# Patient Record
Sex: MALE | Race: WHITE | NOT HISPANIC OR LATINO | URBAN - METROPOLITAN AREA
[De-identification: names, ages, dates, MRNs, and addresses within clinical notes are randomized per-mention and may not be internally consistent; named-entity substitution may affect disease eponyms.]

---

## 2021-05-13 ENCOUNTER — INPATIENT (INPATIENT)
Facility: HOSPITAL | Age: 26
LOS: 1 days | Discharge: HOME | DRG: 349 | End: 2021-05-15
Attending: SURGERY | Admitting: SURGERY
Payer: MEDICAID

## 2021-05-13 VITALS
HEIGHT: 69 IN | OXYGEN SATURATION: 96 % | WEIGHT: 139.99 LBS | TEMPERATURE: 101 F | HEART RATE: 120 BPM | RESPIRATION RATE: 18 BRPM | DIASTOLIC BLOOD PRESSURE: 92 MMHG | SYSTOLIC BLOOD PRESSURE: 147 MMHG

## 2021-05-13 DIAGNOSIS — Z21 ASYMPTOMATIC HUMAN IMMUNODEFICIENCY VIRUS [HIV] INFECTION STATUS: ICD-10-CM

## 2021-05-13 DIAGNOSIS — K61.0 ANAL ABSCESS: ICD-10-CM

## 2021-05-13 DIAGNOSIS — Z86.19 PERSONAL HISTORY OF OTHER INFECTIOUS AND PARASITIC DISEASES: ICD-10-CM

## 2021-05-13 DIAGNOSIS — Z90.89 ACQUIRED ABSENCE OF OTHER ORGANS: Chronic | ICD-10-CM

## 2021-05-13 DIAGNOSIS — F17.210 NICOTINE DEPENDENCE, CIGARETTES, UNCOMPLICATED: ICD-10-CM

## 2021-05-13 DIAGNOSIS — B96.89 OTHER SPECIFIED BACTERIAL AGENTS AS THE CAUSE OF DISEASES CLASSIFIED ELSEWHERE: ICD-10-CM

## 2021-05-13 DIAGNOSIS — K59.00 CONSTIPATION, UNSPECIFIED: ICD-10-CM

## 2021-05-13 DIAGNOSIS — Z72.51 HIGH RISK HETEROSEXUAL BEHAVIOR: ICD-10-CM

## 2021-05-13 LAB
ALBUMIN SERPL ELPH-MCNC: 3 G/DL — LOW (ref 3.4–5)
ALP SERPL-CCNC: 88 U/L — SIGNIFICANT CHANGE UP (ref 40–120)
ALT FLD-CCNC: 17 U/L — SIGNIFICANT CHANGE UP (ref 12–42)
ANION GAP SERPL CALC-SCNC: 7 MMOL/L — LOW (ref 9–16)
APPEARANCE UR: CLEAR — SIGNIFICANT CHANGE UP
APTT BLD: 33.9 SEC — SIGNIFICANT CHANGE UP (ref 27.5–35.5)
AST SERPL-CCNC: 16 U/L — SIGNIFICANT CHANGE UP (ref 15–37)
BASOPHILS # BLD AUTO: 0.01 K/UL — SIGNIFICANT CHANGE UP (ref 0–0.2)
BASOPHILS NFR BLD AUTO: 0.1 % — SIGNIFICANT CHANGE UP (ref 0–2)
BILIRUB SERPL-MCNC: 0.4 MG/DL — SIGNIFICANT CHANGE UP (ref 0.2–1.2)
BILIRUB UR-MCNC: NEGATIVE — SIGNIFICANT CHANGE UP
BUN SERPL-MCNC: 9 MG/DL — SIGNIFICANT CHANGE UP (ref 7–23)
CALCIUM SERPL-MCNC: 8.5 MG/DL — SIGNIFICANT CHANGE UP (ref 8.5–10.5)
CHLORIDE SERPL-SCNC: 97 MMOL/L — SIGNIFICANT CHANGE UP (ref 96–108)
CO2 SERPL-SCNC: 31 MMOL/L — SIGNIFICANT CHANGE UP (ref 22–31)
COLOR SPEC: YELLOW — SIGNIFICANT CHANGE UP
CREAT SERPL-MCNC: 0.85 MG/DL — SIGNIFICANT CHANGE UP (ref 0.5–1.3)
DIFF PNL FLD: NEGATIVE — SIGNIFICANT CHANGE UP
EOSINOPHIL # BLD AUTO: 0.09 K/UL — SIGNIFICANT CHANGE UP (ref 0–0.5)
EOSINOPHIL NFR BLD AUTO: 1 % — SIGNIFICANT CHANGE UP (ref 0–6)
GLUCOSE SERPL-MCNC: 92 MG/DL — SIGNIFICANT CHANGE UP (ref 70–99)
GLUCOSE UR QL: NEGATIVE — SIGNIFICANT CHANGE UP
HCT VFR BLD CALC: 31.5 % — LOW (ref 39–50)
HGB BLD-MCNC: 9.7 G/DL — LOW (ref 13–17)
IMM GRANULOCYTES NFR BLD AUTO: 0.6 % — SIGNIFICANT CHANGE UP (ref 0–1.5)
INR BLD: 1.49 — HIGH (ref 0.88–1.16)
KETONES UR-MCNC: ABNORMAL MG/DL
LACTATE SERPL-SCNC: 0.7 MMOL/L — SIGNIFICANT CHANGE UP (ref 0.4–2)
LEUKOCYTE ESTERASE UR-ACNC: NEGATIVE — SIGNIFICANT CHANGE UP
LYMPHOCYTES # BLD AUTO: 1.43 K/UL — SIGNIFICANT CHANGE UP (ref 1–3.3)
LYMPHOCYTES # BLD AUTO: 16.1 % — SIGNIFICANT CHANGE UP (ref 13–44)
MCHC RBC-ENTMCNC: 23.5 PG — LOW (ref 27–34)
MCHC RBC-ENTMCNC: 30.8 GM/DL — LOW (ref 32–36)
MCV RBC AUTO: 76.5 FL — LOW (ref 80–100)
MONOCYTES # BLD AUTO: 0.95 K/UL — HIGH (ref 0–0.9)
MONOCYTES NFR BLD AUTO: 10.7 % — SIGNIFICANT CHANGE UP (ref 2–14)
NEUTROPHILS # BLD AUTO: 6.35 K/UL — SIGNIFICANT CHANGE UP (ref 1.8–7.4)
NEUTROPHILS NFR BLD AUTO: 71.5 % — SIGNIFICANT CHANGE UP (ref 43–77)
NITRITE UR-MCNC: NEGATIVE — SIGNIFICANT CHANGE UP
NRBC # BLD: 0 /100 WBCS — SIGNIFICANT CHANGE UP (ref 0–0)
PH UR: 6 — SIGNIFICANT CHANGE UP (ref 5–8)
PLATELET # BLD AUTO: 389 K/UL — SIGNIFICANT CHANGE UP (ref 150–400)
POTASSIUM SERPL-MCNC: 3.4 MMOL/L — LOW (ref 3.5–5.3)
POTASSIUM SERPL-SCNC: 3.4 MMOL/L — LOW (ref 3.5–5.3)
PROT SERPL-MCNC: 9.3 G/DL — HIGH (ref 6.4–8.2)
PROT UR-MCNC: NEGATIVE MG/DL — SIGNIFICANT CHANGE UP
PROTHROM AB SERPL-ACNC: 17.5 SEC — HIGH (ref 10.6–13.6)
RBC # BLD: 4.12 M/UL — LOW (ref 4.2–5.8)
RBC # FLD: 14.2 % — SIGNIFICANT CHANGE UP (ref 10.3–14.5)
SARS-COV-2 RNA SPEC QL NAA+PROBE: SIGNIFICANT CHANGE UP
SODIUM SERPL-SCNC: 135 MMOL/L — SIGNIFICANT CHANGE UP (ref 132–145)
SP GR SPEC: 1.01 — SIGNIFICANT CHANGE UP (ref 1–1.03)
UROBILINOGEN FLD QL: 0.2 E.U./DL — SIGNIFICANT CHANGE UP
WBC # BLD: 8.88 K/UL — SIGNIFICANT CHANGE UP (ref 3.8–10.5)
WBC # FLD AUTO: 8.88 K/UL — SIGNIFICANT CHANGE UP (ref 3.8–10.5)

## 2021-05-13 PROCEDURE — 71045 X-RAY EXAM CHEST 1 VIEW: CPT | Mod: 26

## 2021-05-13 PROCEDURE — 93010 ELECTROCARDIOGRAM REPORT: CPT

## 2021-05-13 PROCEDURE — 99285 EMERGENCY DEPT VISIT HI MDM: CPT

## 2021-05-13 PROCEDURE — 74177 CT ABD & PELVIS W/CONTRAST: CPT | Mod: 26

## 2021-05-13 RX ORDER — METRONIDAZOLE 500 MG
500 TABLET ORAL ONCE
Refills: 0 | Status: COMPLETED | OUTPATIENT
Start: 2021-05-13 | End: 2021-05-13

## 2021-05-13 RX ORDER — IOHEXOL 300 MG/ML
30 INJECTION, SOLUTION INTRAVENOUS ONCE
Refills: 0 | Status: COMPLETED | OUTPATIENT
Start: 2021-05-13 | End: 2021-05-13

## 2021-05-13 RX ORDER — METRONIDAZOLE 500 MG
500 TABLET ORAL EVERY 8 HOURS
Refills: 0 | Status: DISCONTINUED | OUTPATIENT
Start: 2021-05-14 | End: 2021-05-15

## 2021-05-13 RX ORDER — SODIUM CHLORIDE 9 MG/ML
1950 INJECTION INTRAMUSCULAR; INTRAVENOUS; SUBCUTANEOUS ONCE
Refills: 0 | Status: COMPLETED | OUTPATIENT
Start: 2021-05-13 | End: 2021-05-13

## 2021-05-13 RX ORDER — HYDROMORPHONE HYDROCHLORIDE 2 MG/ML
0.25 INJECTION INTRAMUSCULAR; INTRAVENOUS; SUBCUTANEOUS ONCE
Refills: 0 | Status: DISCONTINUED | OUTPATIENT
Start: 2021-05-13 | End: 2021-05-13

## 2021-05-13 RX ORDER — CEFTRIAXONE 500 MG/1
1000 INJECTION, POWDER, FOR SOLUTION INTRAMUSCULAR; INTRAVENOUS ONCE
Refills: 0 | Status: COMPLETED | OUTPATIENT
Start: 2021-05-13 | End: 2021-05-13

## 2021-05-13 RX ORDER — METRONIDAZOLE 500 MG
TABLET ORAL
Refills: 0 | Status: DISCONTINUED | OUTPATIENT
Start: 2021-05-13 | End: 2021-05-15

## 2021-05-13 RX ORDER — CEFTRIAXONE 500 MG/1
1000 INJECTION, POWDER, FOR SOLUTION INTRAMUSCULAR; INTRAVENOUS EVERY 24 HOURS
Refills: 0 | Status: DISCONTINUED | OUTPATIENT
Start: 2021-05-14 | End: 2021-05-15

## 2021-05-13 RX ORDER — ACETAMINOPHEN 500 MG
1000 TABLET ORAL ONCE
Refills: 0 | Status: COMPLETED | OUTPATIENT
Start: 2021-05-13 | End: 2021-05-13

## 2021-05-13 RX ORDER — LIDOCAINE HCL 20 MG/ML
10 VIAL (ML) INJECTION ONCE
Refills: 0 | Status: COMPLETED | OUTPATIENT
Start: 2021-05-13 | End: 2021-05-13

## 2021-05-13 RX ORDER — HYDROMORPHONE HYDROCHLORIDE 2 MG/ML
0.5 INJECTION INTRAMUSCULAR; INTRAVENOUS; SUBCUTANEOUS ONCE
Refills: 0 | Status: DISCONTINUED | OUTPATIENT
Start: 2021-05-13 | End: 2021-05-13

## 2021-05-13 RX ORDER — ACETAMINOPHEN 500 MG
650 TABLET ORAL ONCE
Refills: 0 | Status: COMPLETED | OUTPATIENT
Start: 2021-05-13 | End: 2021-05-13

## 2021-05-13 RX ORDER — LIDOCAINE HCL 20 MG/ML
50 VIAL (ML) INJECTION ONCE
Refills: 0 | Status: COMPLETED | OUTPATIENT
Start: 2021-05-13 | End: 2021-05-13

## 2021-05-13 RX ADMIN — CEFTRIAXONE 100 MILLIGRAM(S): 500 INJECTION, POWDER, FOR SOLUTION INTRAMUSCULAR; INTRAVENOUS at 14:30

## 2021-05-13 RX ADMIN — Medication 1000 MILLIGRAM(S): at 23:00

## 2021-05-13 RX ADMIN — Medication 50 MILLILITER(S): at 23:18

## 2021-05-13 RX ADMIN — SODIUM CHLORIDE 1950 MILLILITER(S): 9 INJECTION INTRAMUSCULAR; INTRAVENOUS; SUBCUTANEOUS at 14:25

## 2021-05-13 RX ADMIN — Medication 650 MILLIGRAM(S): at 15:03

## 2021-05-13 RX ADMIN — HYDROMORPHONE HYDROCHLORIDE 0.25 MILLIGRAM(S): 2 INJECTION INTRAMUSCULAR; INTRAVENOUS; SUBCUTANEOUS at 23:41

## 2021-05-13 RX ADMIN — Medication 100 MILLIGRAM(S): at 15:02

## 2021-05-13 RX ADMIN — IOHEXOL 30 MILLILITER(S): 300 INJECTION, SOLUTION INTRAVENOUS at 14:46

## 2021-05-13 RX ADMIN — HYDROMORPHONE HYDROCHLORIDE 0.5 MILLIGRAM(S): 2 INJECTION INTRAMUSCULAR; INTRAVENOUS; SUBCUTANEOUS at 22:58

## 2021-05-13 RX ADMIN — HYDROMORPHONE HYDROCHLORIDE 0.5 MILLIGRAM(S): 2 INJECTION INTRAMUSCULAR; INTRAVENOUS; SUBCUTANEOUS at 23:15

## 2021-05-13 RX ADMIN — Medication 400 MILLIGRAM(S): at 22:27

## 2021-05-13 RX ADMIN — Medication 100 MILLIGRAM(S): at 22:58

## 2021-05-13 RX ADMIN — HYDROMORPHONE HYDROCHLORIDE 0.25 MILLIGRAM(S): 2 INJECTION INTRAMUSCULAR; INTRAVENOUS; SUBCUTANEOUS at 23:55

## 2021-05-13 NOTE — H&P ADULT - NSHPPHYSICALEXAM_GEN_ALL_CORE
Gen: NAD, resting comfortably in bed  Pulm: Good inspiratory effort, nonlabored breathing  C/V: NSR, S1, S2 present  Abd: Soft, NT/ND, no rebound tenderness, guarding, rigidity  Rectal: no masses, blood, perirectal right erythema and tenderness, no drainage, visible 3 cm swelling  Extrem: WWP, no edema  Neuro: AAOx3

## 2021-05-13 NOTE — H&P ADULT - ATTENDING COMMENTS
CT reviewed. Agree with above. CT reviewed. Agree with above.    Additional history: Some bowel irregularity the past month. No incontinence. Has had chlamydia, gonococcal proctitis, and possibly syphlis in past.

## 2021-05-13 NOTE — H&P ADULT - NSICDXFAMILYHX_GEN_ALL_CORE_FT
FAMILY HISTORY:  Grandparent  Still living? Unknown  Family history of diabetes mellitus (DM), Age at diagnosis: Age Unknown  FH: breast cancer, Age at diagnosis: Age Unknown  FH: heart disease, Age at diagnosis: Age Unknown

## 2021-05-13 NOTE — PROCEDURE NOTE - ADDITIONAL PROCEDURE DETAILS
The area was prepared with Chloraprep. Local anesthesia (2% Lidocaine 10 mL) was used. Cruciate incision was done at the site of the abscess and 2 sets of wound cultures were obtained. 20 mL of purulent fluid was extracted. Latha clamp was used to remove septations and pockets within the abscess cavity. Betadine mixed with NS was used to wash the wound. Packing was applied inside the abscess cavity and secured with gauze and paper tape on top.

## 2021-05-13 NOTE — H&P ADULT - HISTORY OF PRESENT ILLNESS
25 yo M w/ PMH of HIV (off medications for more than a year) presents w/ 4 days history of perianal pain and 2 day history of constipation. The patient states that he tends to get bowel movement every other day and is still passing gas. 25 yo M w/ PMH of HIV (off medications for more than a year) and PSH of tonsillectomy at 7 years old presents to [facility] ED w/ 4 days history of perianal pain and 2 day history of constipation. The patient states that he tends to get bowel movement every other day and is still passing gas. The patient denies having history of easy bruising or bleeding disorders. In the ED he has VS of 100.7 F,  bpm, /92 mmHg, Sa 96% on RA. WBC 8.88, H/H 9.7/31.5, INR 1.49, Albumin 3. CT A/P w/ PO and IV contrast demonstrated 3.1 cm perianal abscess w/ suspected perianal fistulous tract and large colonic stool burden. The patient was accepted for the transfer to [facility] Team 4.      27 yo M w/ PMH of HIV (off medications for more than a year) and PSH of tonsillectomy at 7 years old presents to [facility] ED w/ 4 days history of perianal pain and 2 day history of constipation. The patient states that he tends to get bowel movement every other day and is still passing gas. The patient denies having history of easy bruising or bleeding disorders. In the ED he has T of 100.7 F,  bpm, /92 mmHg, Sa 96% on RA. WBC 8.88, H/H 9.7/31.5, INR 1.49, Albumin 3. CT A/P w/ PO and IV contrast demonstrated 3.1 cm perianal abscess w/ suspected perianal fistulous tract and large colonic stool burden. The patient was accepted for the transfer to [facility] Team 4.

## 2021-05-13 NOTE — H&P ADULT - ASSESSMENT
25 yo M w/ PMH of HIV (off medications for more than a year) and PSH of tonsillectomy at 7 years old presents to [facility] ED w/ 4 days history of perianal pain and 2 day history of constipation. The patient states that he tends to get bowel movement every other day and is still passing gas. The patient denies having history of easy bruising or bleeding disorders. In the ED he has VS of 100.7 F,  bpm, /92 mmHg, Sa 96% on RA. WBC 8.88, H/H 9.7/31.5, INR 1.49, Albumin 3. CT A/P w/ PO and IV contrast demonstrated 3.1 cm perianal abscess w/ suspected perianal fistulous tract and large colonic stool burden. The patient was accepted for the transfer to [facility] Team 4. On the presentation to [facility] febrile to 101.6, HDS.    Plan:  - Admit to General Surgery Team 4 [facility]  - Regular diet  - Pain control  - SCDs for DVT ppx., Holding SQH  - Metronidazole, Ceftriaxone  - Attempt bedside drainage, if unsafe book for OR I&D  - AM labs  - HIV Team consult  - OOBA/IS      27 yo M w/ PMH of HIV (off medications for more than a year) and PSH of tonsillectomy at 7 years old presents to [facility] ED w/ 4 days history of perianal pain and 2 day history of constipation. In the ED he has T of 100.7 F,  bpm, /92 mmHg, Sa 96% on RA. WBC 8.88, H/H 9.7/31.5, INR 1.49, Albumin 3. CT A/P w/ PO and IV contrast demonstrated 3.1 cm perianal abscess w/ suspected perianal fistulous tract and large colonic stool burden. The patient was accepted for the transfer to [facility] Team 4. On the presentation to [facility] febrile to 101.6, HDS.    Plan:  - Admit to General Surgery Team 4 [facility]  - Regular diet  - Pain control  - SCDs for DVT ppx., Holding SQH  - Metronidazole, Ceftriaxone  - Attempt bedside drainage, if unsafe book for OR I&D  - AM labs  - HIV Team consult  - OOBA/IS

## 2021-05-13 NOTE — ED PROVIDER NOTE - OBJECTIVE STATEMENT
Rectal pain and fever, has HIV and was on HIV meds until Dec 2019, has been couch-surfing for the past year.

## 2021-05-13 NOTE — PATIENT PROFILE ADULT - FALL HARM RISK CONCLUSION
I will START or STAY ON the medications listed below when I get home from the hospital:    Vicodin 5 mg-300 mg oral tablet  -- 1 tab(s) by mouth every 4 to 6 hours, As Needed -for mild pain MDD:6   -- Caution federal law prohibits the transfer of this drug to any person other  than the person for whom it was prescribed.  Do not drink alcoholic beverages when taking this medication.  May cause drowsiness.  Alcohol may intensify this effect.  Use care when operating dangerous machinery.  This drug may impair the ability to drive or operate machinery.  Use care until you become familiar with its effects.  This product contains acetaminophen.  Do not use  with any other product containing acetaminophen to prevent possible liver damage.  Using more of this medication than prescribed may cause serious breathing problems.    -- Indication: For pain    atenolol 25 mg oral tablet  -- 1 tab(s) by mouth once a day  -- Indication: For home med    hydroCHLOROthiazide 12.5 mg oral capsule  -- 1 cap(s) by mouth once a day  -- Indication: For home med    Multiple Vitamins oral tablet  -- 1 tab(s) by mouth once a day  -- Indication: For home med
Fall Risk

## 2021-05-13 NOTE — H&P ADULT - NSHPSOCIALHISTORY_GEN_ALL_CORE
Smokes cigarettes for 5 years (0.5-1 cigarette/day), social EtOH use, IVDU of Methamphetamine (last time a week ago), smoking of Methamphetamine (last time 72 hours ago), Marijuana smoking (last time @ 3 p.m. today), on regular diet, bikes daily, has a culinary degree, unemployed currently

## 2021-05-14 DIAGNOSIS — Z01.818 ENCOUNTER FOR OTHER PREPROCEDURAL EXAMINATION: ICD-10-CM

## 2021-05-14 DIAGNOSIS — B20 HUMAN IMMUNODEFICIENCY VIRUS [HIV] DISEASE: ICD-10-CM

## 2021-05-14 DIAGNOSIS — D64.9 ANEMIA, UNSPECIFIED: ICD-10-CM

## 2021-05-14 DIAGNOSIS — K61.0 ANAL ABSCESS: ICD-10-CM

## 2021-05-14 LAB
ANION GAP SERPL CALC-SCNC: 7 MMOL/L — SIGNIFICANT CHANGE UP (ref 5–17)
BLD GP AB SCN SERPL QL: NEGATIVE — SIGNIFICANT CHANGE UP
BLD GP AB SCN SERPL QL: NEGATIVE — SIGNIFICANT CHANGE UP
BUN SERPL-MCNC: 7 MG/DL — SIGNIFICANT CHANGE UP (ref 7–23)
CALCIUM SERPL-MCNC: 8.1 MG/DL — LOW (ref 8.4–10.5)
CHLORIDE SERPL-SCNC: 103 MMOL/L — SIGNIFICANT CHANGE UP (ref 96–108)
CO2 SERPL-SCNC: 28 MMOL/L — SIGNIFICANT CHANGE UP (ref 22–31)
COVID-19 SPIKE DOMAIN AB INTERP: POSITIVE
COVID-19 SPIKE DOMAIN ANTIBODY RESULT: 12.7 U/ML — HIGH
CREAT SERPL-MCNC: 0.71 MG/DL — SIGNIFICANT CHANGE UP (ref 0.5–1.3)
CULTURE RESULTS: NO GROWTH — SIGNIFICANT CHANGE UP
GLUCOSE SERPL-MCNC: 92 MG/DL — SIGNIFICANT CHANGE UP (ref 70–99)
GRAM STN FLD: SIGNIFICANT CHANGE UP
GRAM STN FLD: SIGNIFICANT CHANGE UP
HCT VFR BLD CALC: 32.4 % — LOW (ref 39–50)
HGB BLD-MCNC: 9.7 G/DL — LOW (ref 13–17)
MAGNESIUM SERPL-MCNC: 2.1 MG/DL — SIGNIFICANT CHANGE UP (ref 1.6–2.6)
MCHC RBC-ENTMCNC: 23.2 PG — LOW (ref 27–34)
MCHC RBC-ENTMCNC: 29.9 GM/DL — LOW (ref 32–36)
MCV RBC AUTO: 77.3 FL — LOW (ref 80–100)
NRBC # BLD: 0 /100 WBCS — SIGNIFICANT CHANGE UP (ref 0–0)
PHOSPHATE SERPL-MCNC: 4.4 MG/DL — SIGNIFICANT CHANGE UP (ref 2.5–4.5)
PLATELET # BLD AUTO: 362 K/UL — SIGNIFICANT CHANGE UP (ref 150–400)
POTASSIUM SERPL-MCNC: 3.8 MMOL/L — SIGNIFICANT CHANGE UP (ref 3.5–5.3)
POTASSIUM SERPL-SCNC: 3.8 MMOL/L — SIGNIFICANT CHANGE UP (ref 3.5–5.3)
RBC # BLD: 4.19 M/UL — LOW (ref 4.2–5.8)
RBC # FLD: 14.6 % — HIGH (ref 10.3–14.5)
RH IG SCN BLD-IMP: POSITIVE — SIGNIFICANT CHANGE UP
RH IG SCN BLD-IMP: POSITIVE — SIGNIFICANT CHANGE UP
SARS-COV-2 IGG+IGM SERPL QL IA: 12.7 U/ML — HIGH
SARS-COV-2 IGG+IGM SERPL QL IA: POSITIVE
SODIUM SERPL-SCNC: 138 MMOL/L — SIGNIFICANT CHANGE UP (ref 135–145)
SPECIMEN SOURCE: SIGNIFICANT CHANGE UP
WBC # BLD: 6.86 K/UL — SIGNIFICANT CHANGE UP (ref 3.8–10.5)
WBC # FLD AUTO: 6.86 K/UL — SIGNIFICANT CHANGE UP (ref 3.8–10.5)

## 2021-05-14 PROCEDURE — 99221 1ST HOSP IP/OBS SF/LOW 40: CPT

## 2021-05-14 PROCEDURE — 99253 IP/OBS CNSLTJ NEW/EST LOW 45: CPT

## 2021-05-14 PROCEDURE — 99222 1ST HOSP IP/OBS MODERATE 55: CPT | Mod: GC

## 2021-05-14 PROCEDURE — 99254 IP/OBS CNSLTJ NEW/EST MOD 60: CPT | Mod: GC

## 2021-05-14 RX ORDER — SODIUM CHLORIDE 9 MG/ML
500 INJECTION, SOLUTION INTRAVENOUS ONCE
Refills: 0 | Status: COMPLETED | OUTPATIENT
Start: 2021-05-14 | End: 2021-05-14

## 2021-05-14 RX ORDER — POTASSIUM CHLORIDE 20 MEQ
20 PACKET (EA) ORAL ONCE
Refills: 0 | Status: COMPLETED | OUTPATIENT
Start: 2021-05-14 | End: 2021-05-14

## 2021-05-14 RX ORDER — ONDANSETRON 8 MG/1
4 TABLET, FILM COATED ORAL EVERY 6 HOURS
Refills: 0 | Status: DISCONTINUED | OUTPATIENT
Start: 2021-05-14 | End: 2021-05-15

## 2021-05-14 RX ORDER — HYDROMORPHONE HYDROCHLORIDE 2 MG/ML
0.5 INJECTION INTRAMUSCULAR; INTRAVENOUS; SUBCUTANEOUS EVERY 6 HOURS
Refills: 0 | Status: DISCONTINUED | OUTPATIENT
Start: 2021-05-14 | End: 2021-05-15

## 2021-05-14 RX ORDER — ACETAMINOPHEN 500 MG
650 TABLET ORAL EVERY 6 HOURS
Refills: 0 | Status: DISCONTINUED | OUTPATIENT
Start: 2021-05-14 | End: 2021-05-15

## 2021-05-14 RX ADMIN — SODIUM CHLORIDE 1000 MILLILITER(S): 9 INJECTION, SOLUTION INTRAVENOUS at 13:30

## 2021-05-14 RX ADMIN — Medication 100 MILLIGRAM(S): at 15:59

## 2021-05-14 RX ADMIN — Medication 20 MILLIEQUIVALENT(S): at 09:53

## 2021-05-14 RX ADMIN — Medication 650 MILLIGRAM(S): at 07:00

## 2021-05-14 RX ADMIN — CEFTRIAXONE 100 MILLIGRAM(S): 500 INJECTION, POWDER, FOR SOLUTION INTRAMUSCULAR; INTRAVENOUS at 13:30

## 2021-05-14 RX ADMIN — Medication 650 MILLIGRAM(S): at 06:26

## 2021-05-14 RX ADMIN — Medication 650 MILLIGRAM(S): at 14:09

## 2021-05-14 RX ADMIN — Medication 650 MILLIGRAM(S): at 19:27

## 2021-05-14 RX ADMIN — Medication 100 MILLIGRAM(S): at 06:26

## 2021-05-14 RX ADMIN — Medication 100 MILLIGRAM(S): at 23:23

## 2021-05-14 RX ADMIN — Medication 650 MILLIGRAM(S): at 13:30

## 2021-05-14 NOTE — CONSULT NOTE ADULT - PROBLEM SELECTOR RECOMMENDATION 3
METS>4, Performs ADLs independently   No decrease in exercise tolerance   RCRI 0, Class I risk   Can proceed to OR without further cardiac workup

## 2021-05-14 NOTE — CONSULT NOTE ADULT - PROBLEM SELECTOR RECOMMENDATION 9
Plan per primary team   CW IV abx, awaiting final culture data, currently with GNR, gram negative coccobacilli, Gram positive cocci in pairs   May need further drainage, per primary team   Pain control

## 2021-05-14 NOTE — CONSULT NOTE ADULT - PROBLEM SELECTOR RECOMMENDATION 2
Was diagnosed over two years ago   stopped Biktarvy one year ago since he moved to [place] right before COVID and did not find a doctor   Interested in establishing Care   HIV consult, appreciate leif

## 2021-05-14 NOTE — CONSULT NOTE ADULT - ASSESSMENT
27 yo M w/ PMH of HIV (off medications for more than a year) and PSH of tonsillectomy at 7 years old presents to [facility] ED w/ 4 days history of perianal pain and 2 day history of constipation. CT A/P w/ PO and IV contrast demonstrated 3.1 cm perianal abscess w/ suspected perianal fistulous tract and large colonic stool burden

## 2021-05-14 NOTE — CONSULT NOTE ADULT - SUBJECTIVE AND OBJECTIVE BOX
Patient is a 26y old  Male who presents with a chief complaint of Perianal abscess (14 May 2021 07:20)    HPI:  27 yo M w/ PMH of HIV (off medications for more than a year) and PSH of tonsillectomy at 7 years old presents to [facility] ED w/ 4 days history of perianal pain and 2 day history of constipation. The patient states that he tends to get bowel movement every other day and is still passing gas. The patient denies having history of easy bruising or bleeding disorders. In the ED he has T of 100.7 F,  bpm, /92 mmHg, Sa 96% on RA. WBC 8.88, H/H 9.7/31.5, INR 1.49, Albumin 3. CT A/P w/ PO and IV contrast demonstrated 3.1 cm perianal abscess w/ suspected perianal fistulous tract and large colonic stool burden. The patient was accepted for the transfer to [facility] Team 4.      (13 May 2021 22:11)      INTERVAL HPI/OVERNIGHT EVENTS:  Patient was seen and examined at bedside. As per nurse and patient, no o/n events, patient resting comfortably. Endorses improvement in pain. Feels better overall and hopes to be able to go home soon. Patient denies: fever, chills, dizziness, weakness, HA, Changes in vision, CP, palpitations, SOB, cough, N/V/D/C, dysuria, changes in bowel movements, LE edema.      PAST MEDICAL & SURGICAL HISTORY:  HIV (human immunodeficiency virus infection)    Psoriasis    History of tonsillectomy  @ 7 years old        SOCIAL HISTORY  Alcohol: none  Tobacco: social 1-2 a week  Illicit substance use: marijuana, methamphetamines       FAMILY HISTORY:    REVIEW OF SYSTEMS:  CONSTITUTIONAL: No fever, weight loss, or fatigue  EYES: No eye pain, visual disturbances, or discharge  ENMT:  No difficulty hearing, tinnitus, vertigo; No sinus or throat pain  NECK: No pain or stiffness  RESPIRATORY: No cough, wheezing, chills or hemoptysis; No shortness of breath  CARDIOVASCULAR: No chest pain, palpitations, dizziness, or leg swelling  GASTROINTESTINAL: No abdominal or epigastric pain. No nausea, vomiting, or hematemesis; No diarrhea or constipation. No melena or hematochezia.  GENITOURINARY: No dysuria, frequency, hematuria, or incontinence  NEUROLOGICAL: No headaches, memory loss, loss of strength, numbness, or tremors  SKIN: as above  LYMPH NODES: No enlarged glands  ENDOCRINE: No heat or cold intolerance; No hair loss  MUSCULOSKELETAL: No joint pain or swelling; No muscle, back, or extremity pain  PSYCHIATRIC: No depression, anxiety, mood swings, or difficulty sleeping  HEME/LYMPH: No easy bruising, or bleeding gums  ALLERY AND IMMUNOLOGIC: No hives or eczema    T(C): 36.9 (21 @ 13:07), Max: 38.7 (21 @ 14:30)  HR: 87 (21 @ 13:07) (87 - 120)  BP: 99/63 (21 @ 13:07) (89/62 - 150/87)  RR: 18 (21 @ 13:07) (16 - 18)  SpO2: 98% (21 @ 13:07) (96% - 100%)  Wt(kg): --  I&O's Summary    13 May 2021 07:  -  14 May 2021 07:00  --------------------------------------------------------  IN: 700 mL / OUT: 0 mL / NET: 700 mL    14 May 2021 07:01  -  14 May 2021 14:00  --------------------------------------------------------  IN: 850 mL / OUT: 800 mL / NET: 50 mL        PHYSICAL EXAM:  GENERAL: NAD, laying comfortably in bed  HEAD:  Atraumatic, Normocephalic  EYES: EOMI, PERRLA, conjunctiva and sclera clear  ENMT: No tonsillar erythema, exudates, or enlargement; MMM  NECK: Supple, No JVD  NERVOUS SYSTEM:  Alert & Oriented X3, no focal deficits   CHEST/LUNG: Clear to percussion bilaterally; No rales, rhonchi, wheezing, or rubs  HEART: Regular rate and rhythm; No murmurs, rubs, or gallops  ABDOMEN: Soft, Nontender, Nondistended; Bowel sounds present  EXTREMITIES:  2+ Peripheral Pulses, No clubbing, cyanosis, or edema  LYMPH: No lymphadenopathy noted  SKIN: In rectal region- Right side with packing in place, slight drainage notes, erythema and tenderness to palpation      LABS:                        9.7    6.86  )-----------( 362      ( 14 May 2021 06:45 )             32.4     05-    138  |  103  |  7   ----------------------------<  92  3.8   |  28  |  0.71    Ca    8.1<L>      14 May 2021 06:45  Phos  4.4     -  Mg     2.1     -    TPro  9.3<H>  /  Alb  3.0<L>  /  TBili  0.4  /  DBili  x   /  AST  16  /  ALT  17  /  AlkPhos  88  05-13    PT/INR - ( 13 May 2021 14:43 )   PT: 17.5 sec;   INR: 1.49          PTT - ( 13 May 2021 14:43 )  PTT:33.9 sec  Urinalysis Basic - ( 13 May 2021 17:19 )    Color: Yellow / Appearance: Clear / S.010 / pH: x  Gluc: x / Ketone: Trace mg/dL  / Bili: NEGATIVE / Urobili: 0.2 E.U./dL   Blood: x / Protein: NEGATIVE mg/dL / Nitrite: NEGATIVE   Leuk Esterase: NEGATIVE / RBC: x / WBC x   Sq Epi: x / Non Sq Epi: x / Bacteria: x      CAPILLARY BLOOD GLUCOSE            Urinalysis Basic - ( 13 May 2021 17:19 )    Color: Yellow / Appearance: Clear / S.010 / pH: x  Gluc: x / Ketone: Trace mg/dL  / Bili: NEGATIVE / Urobili: 0.2 E.U./dL   Blood: x / Protein: NEGATIVE mg/dL / Nitrite: NEGATIVE   Leuk Esterase: NEGATIVE / RBC: x / WBC x   Sq Epi: x / Non Sq Epi: x / Bacteria: x        MEDICATIONS  (STANDING):  acetaminophen   Tablet .. 650 milliGRAM(s) Oral every 6 hours  cefTRIAXone   IVPB 1000 milliGRAM(s) IV Intermittent every 24 hours  metroNIDAZOLE  IVPB      metroNIDAZOLE  IVPB 500 milliGRAM(s) IV Intermittent every 8 hours    MEDICATIONS  (PRN):  HYDROmorphone  Injectable 0.5 milliGRAM(s) IV Push every 6 hours PRN Severe Pain (7 - 10)  ondansetron    Tablet 4 milliGRAM(s) Oral every 6 hours PRN Nausea and/or Vomiting      RADIOLOGY & ADDITIONAL TESTS:    Imaging Personally Reviewed:  [ ] YES  [ ] NO    Consultant(s) Notes Reviewed:  [ ] YES  [ ] NO    Care Discussed with Consultants/Other Providers [ ] YES  [ ] NO

## 2021-05-14 NOTE — PROGRESS NOTE ADULT - SUBJECTIVE AND OBJECTIVE BOX
24 hr events:  O/N: admitted, bedside I&D of perianal absces, surgical swab cx. w/ Gram -ve rods, coccobacilli and Gram +ve cocci in pairs    SUBJECTIVE:  Pt seen and examined by chief resident. Pt is doing well, resting comfortably on bed. Feeling better, pain now resolved.     Vital Signs Last 24 Hrs  T(C): 36.5 (14 May 2021 04:15), Max: 38.7 (13 May 2021 14:30)  T(F): 97.7 (14 May 2021 04:15), Max: 101.7 (13 May 2021 14:37)  HR: 89 (14 May 2021 04:15) (89 - 120)  BP: 100/63 (14 May 2021 04:15) (89/62 - 150/87)  BP(mean): --  RR: 18 (14 May 2021 04:15) (16 - 18)  SpO2: 98% (14 May 2021 04:15) (96% - 100%)    Physical Exam:  General: NAD  Pulmonary: Nonlabored breathing, no respiratory distress  Cardiovascular: NSR  Abdominal: soft, NT/ND  Rectal: perirectal right erythema and tenderness, packed with iodoform, serosanguinous drainage, no purlulent drainage  Extremities: WWP, SCDs in place      I&O's Summary    13 May 2021 07:01  -  14 May 2021 07:00  --------------------------------------------------------  IN: 700 mL / OUT: 0 mL / NET: 700 mL        LABS:                        9.7    6.86  )-----------( 362      ( 14 May 2021 06:45 )             32.4     05-14    138  |  103  |  7   ----------------------------<  92  3.8   |  28  |  0.71    Ca    8.1<L>      14 May 2021 06:45  Phos  4.4     05-14  Mg     2.1     05-14    TPro  9.3<H>  /  Alb  3.0<L>  /  TBili  0.4  /  DBili  x   /  AST  16  /  ALT  17  /  AlkPhos  88  05-13    PT/INR - ( 13 May 2021 14:43 )   PT: 17.5 sec;   INR: 1.49          PTT - ( 13 May 2021 14:43 )  PTT:33.9 sec  Urinalysis Basic - ( 13 May 2021 17:19 )    Color: Yellow / Appearance: Clear / S.010 / pH: x  Gluc: x / Ketone: Trace mg/dL  / Bili: NEGATIVE / Urobili: 0.2 E.U./dL   Blood: x / Protein: NEGATIVE mg/dL / Nitrite: NEGATIVE   Leuk Esterase: NEGATIVE / RBC: x / WBC x   Sq Epi: x / Non Sq Epi: x / Bacteria: x      LIVER FUNCTIONS - ( 13 May 2021 14:43 )  Alb: 3.0 g/dL / Pro: 9.3 g/dL / ALK PHOS: 88 U/L / ALT: 17 U/L / AST: 16 U/L / GGT: x

## 2021-05-14 NOTE — CHART NOTE - NSCHARTNOTEFT_GEN_A_CORE
HIV Consult Note  Admission H&P, Progress Notes and Consultant Notes reviewed by me in detail.    CC:  Patient is a 26y old  Male who presents with a chief complaint of Perianal abscess    Additional HPI:  26MSM w/ HIV, off tx for 1 year.  Dx in 2020 on screening after partner tested positive.  Started on Biktarvy at that time.  Was in care in [place] but then moved to [place] just prior to COVID pandemic.  After move, he was unable to establish care.  He ran out of ARVs and has been off meds for 1 year.  He is sexually active w/ >10 different partners.  Vers w/ suboptimal condom use.  Hx of GC/Chlamydia and syphilis.  Occasionally uses meth (smokes and IV).  Currently w/ unstable housing.    12 pt ROS otherwise negative.    PAST MEDICAL & SURGICAL HISTORY:  HIV (human immunodeficiency virus infection)    Psoriasis    History of tonsillectomy  @ 7 years old      STI Hx: GC/chlamydia, syphilis    FHx: Non-contributory    HIV History:  Outpatient HIV Provider: None  Year of HIV Diagnosis:   T cell alix:   Highest Viral Load:   Current ARV regimen: None  ARV adherence: Suboptimal  Previous ARV regimens: Biktarby  Hx of Past Opportunistic Infections: None    Social/Sexual Hx: MSM, vers, suboptimal condom use, high risk.    MEDICATIONS  (STANDING):  acetaminophen   Tablet .. 650 milliGRAM(s) Oral every 6 hours  cefTRIAXone   IVPB 1000 milliGRAM(s) IV Intermittent every 24 hours  melatonin 5 milliGRAM(s) Oral at bedtime  metroNIDAZOLE  IVPB      metroNIDAZOLE  IVPB 500 milliGRAM(s) IV Intermittent every 8 hours    MEDICATIONS  (PRN):  ondansetron    Tablet 4 milliGRAM(s) Oral every 6 hours PRN Nausea and/or Vomiting      Allergies    No Known Drug Allergies  Seasonal (Sneezing)    Intolerances        PHYSICAL EXAM  General: A&Ox3; NAD  Head: NC/AT; MMM; PERRL; EOMI;  Neck: Supple; no JVD  Respiratory: CTA B/L; no wheezes/crackles   Cardiovascular: Regular rhythm/rate; S1/S2   Gastrointestinal: Soft; NTND; normoactive BS  Extremities: WWP; no edema/cyanosis  : Normal appearing penis and scrotum  Anal: Gauze covered abscess  Neurological:  CNII-XII grossly intact; no obvious focal deficits    LABS:                         9.7    6.86  )-----------( 362      ( 14 May 2021 06:45 )             32.4     05-14    138  |  103  |  7   ----------------------------<  92  3.8   |  28  |  0.71    Ca    8.1<L>      14 May 2021 06:45  Phos  4.4     05-14  Mg     2.1     -14    TPro  9.3<H>  /  Alb  3.0<L>  /  TBili  0.4  /  DBili  x   /  AST  16  /  ALT  17  /  AlkPhos  88  05-13    PT/INR - ( 13 May 2021 14:43 )   PT: 17.5 sec;   INR: 1.49          PTT - ( 13 May 2021 14:43 )  PTT:33.9 sec  Urinalysis Basic - ( 13 May 2021 17:19 )    Color: Yellow / Appearance: Clear / S.010 / pH: x  Gluc: x / Ketone: Trace mg/dL  / Bili: NEGATIVE / Urobili: 0.2 E.U./dL   Blood: x / Protein: NEGATIVE mg/dL / Nitrite: NEGATIVE   Leuk Esterase: NEGATIVE / RBC: x / WBC x   Sq Epi: x / Non Sq Epi: x / Bacteria: x        Microbiology/Cultures: Reviewed    Images/EKG/etc: Reviewed        Assessment/Recommendation:  26MSM w/ HIV, possibly AIDS presents for perianal abscess.  He is ready to re-establish care as he just hasn't been feeling well.    Will plan to restart ARVs pending labs to ascertain his risk for OIs.  No evidence of OI on physical exam.    Ordered VL, CD4.  Will plan to start Biktarvy when resulted vs OI screening if CD4 is lower than expected.    Genotype ordered.  Hep C/B screening in setting of IVDU.  GC/Chlamydia and syphilis screening.  Will need anal GC/Chlamydia swab.  Provided C contact info.  Patient will need to update insurance which SW at [facility] can assist with.    Provided HIV infection and treatment education to patient  Counseled patient on risks/benefits of treatment and non-adherence    Recommendations discussed w/ Team 4 Surgery.    HIV team will continue to follow. HIV Consult Note  Admission H&P, Progress Notes and Consultant Notes reviewed by me in detail.    CC:  Patient is a 26y old  Male who presents with a chief complaint of Perianal abscess    Additional HPI:  26MSM w/ HIV, off tx for 1 year.  Dx in 2020 on screening after partner tested positive.  Started on Biktarvy at that time.  Was in care in [place] but then moved to [place] just prior to COVID pandemic.  After move, he was unable to establish care.  He ran out of ARVs and has been off meds for 1 year.  He is sexually active w/ >10 different partners.  Vers w/ suboptimal condom use.  Hx of GC/Chlamydia and syphilis.  Occasionally uses meth (smokes and IV).  Currently w/ unstable housing.    12 pt ROS otherwise negative.    PAST MEDICAL & SURGICAL HISTORY:  HIV (human immunodeficiency virus infection)    Psoriasis    History of tonsillectomy  @ 7 years old      STI Hx: GC/chlamydia, syphilis    FHx: Non-contributory    HIV History:  Outpatient HIV Provider: None  Year of HIV Diagnosis:   T cell alix:   Highest Viral Load:   Current ARV regimen: None  ARV adherence: Suboptimal  Previous ARV regimens: Biktarby  Hx of Past Opportunistic Infections: None    Social/Sexual Hx: MSM, vers, suboptimal condom use, high risk.    MEDICATIONS  (STANDING):  acetaminophen   Tablet .. 650 milliGRAM(s) Oral every 6 hours  cefTRIAXone   IVPB 1000 milliGRAM(s) IV Intermittent every 24 hours  melatonin 5 milliGRAM(s) Oral at bedtime  metroNIDAZOLE  IVPB      metroNIDAZOLE  IVPB 500 milliGRAM(s) IV Intermittent every 8 hours    MEDICATIONS  (PRN):  ondansetron    Tablet 4 milliGRAM(s) Oral every 6 hours PRN Nausea and/or Vomiting      Allergies    No Known Drug Allergies  Seasonal (Sneezing)    Intolerances    Vitals reviewed      PHYSICAL EXAM  General: A&Ox3; NAD  Head: NC/AT; MMM; PERRL; EOMI;  Neck: Supple; no JVD  Respiratory: CTA B/L; no wheezes/crackles   Cardiovascular: Regular rhythm/rate; S1/S2   Gastrointestinal: Soft; NTND; normoactive BS  Extremities: WWP; no edema/cyanosis  : Normal appearing penis and scrotum  Anal: Gauze covered abscess  Neurological:  CNII-XII grossly intact; no obvious focal deficits    LABS:                         9.7    6.86  )-----------( 362      ( 14 May 2021 06:45 )             32.4     05-14    138  |  103  |  7   ----------------------------<  92  3.8   |  28  |  0.71    Ca    8.1<L>      14 May 2021 06:45  Phos  4.4     05-14  Mg     2.1     05-14    TPro  9.3<H>  /  Alb  3.0<L>  /  TBili  0.4  /  DBili  x   /  AST  16  /  ALT  17  /  AlkPhos  88  05-13    PT/INR - ( 13 May 2021 14:43 )   PT: 17.5 sec;   INR: 1.49          PTT - ( 13 May 2021 14:43 )  PTT:33.9 sec  Urinalysis Basic - ( 13 May 2021 17:19 )    Color: Yellow / Appearance: Clear / S.010 / pH: x  Gluc: x / Ketone: Trace mg/dL  / Bili: NEGATIVE / Urobili: 0.2 E.U./dL   Blood: x / Protein: NEGATIVE mg/dL / Nitrite: NEGATIVE   Leuk Esterase: NEGATIVE / RBC: x / WBC x   Sq Epi: x / Non Sq Epi: x / Bacteria: x        Microbiology/Cultures: Reviewed    Images/EKG/etc: Reviewed        Assessment/Recommendation:  26MSM w/ HIV, possibly AIDS presents for perianal abscess.  He is ready to re-establish care as he just hasn't been feeling well.    Will plan to restart ARVs pending labs to ascertain his risk for OIs.  No evidence of OI on physical exam.    Ordered VL, CD4.  Will plan to start Biktarvy when resulted vs OI screening if CD4 is lower than expected.    Genotype ordered.  Hep C/B screening in setting of IVDU.  GC/Chlamydia and syphilis screening.  Will need anal GC/Chlamydia swab.  Provided [facility] contact info.  Patient will need to update insurance which SW at [facility] can assist with.    Provided HIV infection and treatment education to patient  Counseled patient on risks/benefits of treatment and non-adherence    Recommendations discussed w/ Team 4 Surgery.    HIV team will continue to follow.

## 2021-05-15 ENCOUNTER — TRANSCRIPTION ENCOUNTER (OUTPATIENT)
Age: 26
End: 2021-05-15

## 2021-05-15 VITALS
DIASTOLIC BLOOD PRESSURE: 76 MMHG | SYSTOLIC BLOOD PRESSURE: 131 MMHG | OXYGEN SATURATION: 100 % | TEMPERATURE: 98 F | RESPIRATION RATE: 16 BRPM | HEART RATE: 76 BPM

## 2021-05-15 LAB
ANION GAP SERPL CALC-SCNC: 8 MMOL/L — SIGNIFICANT CHANGE UP (ref 5–17)
BUN SERPL-MCNC: 7 MG/DL — SIGNIFICANT CHANGE UP (ref 7–23)
CALCIUM SERPL-MCNC: 8.4 MG/DL — SIGNIFICANT CHANGE UP (ref 8.4–10.5)
CHLORIDE SERPL-SCNC: 103 MMOL/L — SIGNIFICANT CHANGE UP (ref 96–108)
CO2 SERPL-SCNC: 27 MMOL/L — SIGNIFICANT CHANGE UP (ref 22–31)
CREAT SERPL-MCNC: 0.66 MG/DL — SIGNIFICANT CHANGE UP (ref 0.5–1.3)
GLUCOSE SERPL-MCNC: 96 MG/DL — SIGNIFICANT CHANGE UP (ref 70–99)
HBV CORE AB SER-ACNC: SIGNIFICANT CHANGE UP
HBV SURFACE AB SER-ACNC: REACTIVE
HBV SURFACE AG SER-ACNC: SIGNIFICANT CHANGE UP
HCT VFR BLD CALC: 32.1 % — LOW (ref 39–50)
HCV AB S/CO SERPL IA: 0.04 S/CO — SIGNIFICANT CHANGE UP
HCV AB SERPL-IMP: SIGNIFICANT CHANGE UP
HGB BLD-MCNC: 9.6 G/DL — LOW (ref 13–17)
MAGNESIUM SERPL-MCNC: 1.9 MG/DL — SIGNIFICANT CHANGE UP (ref 1.6–2.6)
MCHC RBC-ENTMCNC: 23 PG — LOW (ref 27–34)
MCHC RBC-ENTMCNC: 29.9 GM/DL — LOW (ref 32–36)
MCV RBC AUTO: 76.8 FL — LOW (ref 80–100)
NRBC # BLD: 0 /100 WBCS — SIGNIFICANT CHANGE UP (ref 0–0)
PHOSPHATE SERPL-MCNC: 4.1 MG/DL — SIGNIFICANT CHANGE UP (ref 2.5–4.5)
PLATELET # BLD AUTO: 373 K/UL — SIGNIFICANT CHANGE UP (ref 150–400)
POTASSIUM SERPL-MCNC: 4 MMOL/L — SIGNIFICANT CHANGE UP (ref 3.5–5.3)
POTASSIUM SERPL-SCNC: 4 MMOL/L — SIGNIFICANT CHANGE UP (ref 3.5–5.3)
RBC # BLD: 4.18 M/UL — LOW (ref 4.2–5.8)
RBC # FLD: 14.6 % — HIGH (ref 10.3–14.5)
SODIUM SERPL-SCNC: 138 MMOL/L — SIGNIFICANT CHANGE UP (ref 135–145)
WBC # BLD: 5.33 K/UL — SIGNIFICANT CHANGE UP (ref 3.8–10.5)
WBC # FLD AUTO: 5.33 K/UL — SIGNIFICANT CHANGE UP (ref 3.8–10.5)

## 2021-05-15 PROCEDURE — 99232 SBSQ HOSP IP/OBS MODERATE 35: CPT | Mod: GC

## 2021-05-15 RX ORDER — LANOLIN ALCOHOL/MO/W.PET/CERES
5 CREAM (GRAM) TOPICAL AT BEDTIME
Refills: 0 | Status: DISCONTINUED | OUTPATIENT
Start: 2021-05-15 | End: 2021-05-15

## 2021-05-15 RX ORDER — CEFPODOXIME PROXETIL 100 MG
2 TABLET ORAL
Qty: 20 | Refills: 0
Start: 2021-05-15 | End: 2021-05-19

## 2021-05-15 RX ORDER — OXYCODONE HYDROCHLORIDE 5 MG/1
1 TABLET ORAL
Qty: 12 | Refills: 0
Start: 2021-05-15 | End: 2021-05-17

## 2021-05-15 RX ORDER — METRONIDAZOLE 500 MG
1 TABLET ORAL
Qty: 15 | Refills: 0
Start: 2021-05-15 | End: 2021-05-19

## 2021-05-15 RX ORDER — DOCUSATE SODIUM 100 MG
1 CAPSULE ORAL
Qty: 20 | Refills: 0
Start: 2021-05-15 | End: 2021-05-24

## 2021-05-15 RX ADMIN — Medication 650 MILLIGRAM(S): at 01:30

## 2021-05-15 RX ADMIN — Medication 650 MILLIGRAM(S): at 00:42

## 2021-05-15 RX ADMIN — CEFTRIAXONE 100 MILLIGRAM(S): 500 INJECTION, POWDER, FOR SOLUTION INTRAMUSCULAR; INTRAVENOUS at 13:35

## 2021-05-15 RX ADMIN — Medication 650 MILLIGRAM(S): at 07:00

## 2021-05-15 RX ADMIN — Medication 100 MILLIGRAM(S): at 06:28

## 2021-05-15 RX ADMIN — Medication 650 MILLIGRAM(S): at 06:28

## 2021-05-15 RX ADMIN — Medication 100 MILLIGRAM(S): at 14:39

## 2021-05-15 NOTE — PROGRESS NOTE ADULT - ASSESSMENT
27 yo M w/ PMH of HIV (off medications for more than a year) and PSH of tonsillectomy at 7 years old presents to [facility] ED w/ 4 days history of perianal pain and 2 day history of constipation. In the ED he has T of 100.7 F,  bpm, /92 mmHg, Sa 96% on RA. WBC 8.88, H/H 9.7/31.5, INR 1.49, Albumin 3. CT A/P w/ PO and IV contrast demonstrated 3.1 cm perianal abscess w/ suspected perianal fistulous tract and large colonic stool burden. Now s/p bedside I&D of perianal abscess (5/13).    - Regular diet  - Pain control  - SCDs for DVT ppx.,  - Metronidazole, Ceftriaxone  - AM labs  - HIV Team consult  - OOBA/IS  
27 yo M w/ PMH of HIV (off medications for more than a year) and PSH of tonsillectomy at 7 years old presents to [place] ED w/ 4 days history of perianal pain and 2 day history of constipation. CT A/P w/ PO and IV contrast demonstrated 3.1 cm perianal abscess w/ suspected perianal fistulous tract and large colonic stool burden

## 2021-05-15 NOTE — PROGRESS NOTE ADULT - PROBLEM SELECTOR PLAN 2
Was diagnosed over two years ago   stopped Biktar[facility] one year ago since he moved to [place] right before COVID and did not find a doctor   Interested in establishing Care   HIV consult, appreciate recs, will follow up further labs

## 2021-05-15 NOTE — PROGRESS NOTE ADULT - SUBJECTIVE AND OBJECTIVE BOX
Patient is a 26y old  Male who presents with a chief complaint of Perianal abscess (15 May 2021 06:21)      INTERVAL HPI/OVERNIGHT EVENTS:  Patient was seen and examined at bedside. As per nurse and patient, no o/n events, patient resting comfortably. No complaints at this time. Patient denies: fever, chills, dizziness, weakness, HA, Changes in vision, CP, palpitations, SOB, cough, N/V/D/C, dysuria, changes in bowel movements, LE edema.      PAST MEDICAL & SURGICAL HISTORY:  HIV (human immunodeficiency virus infection)    Psoriasis    History of tonsillectomy  @ 7 years old        SOCIAL HISTORY  Alcohol:  Tobacco:  Illicit substance use:      FAMILY HISTORY:    REVIEW OF SYSTEMS:  CONSTITUTIONAL: No fever, weight loss, or fatigue  EYES: No eye pain, visual disturbances, or discharge  ENMT:  No difficulty hearing, tinnitus, vertigo; No sinus or throat pain  NECK: No pain or stiffness  RESPIRATORY: No cough, wheezing, chills or hemoptysis; No shortness of breath  CARDIOVASCULAR: No chest pain, palpitations, dizziness, or leg swelling  GASTROINTESTINAL: No abdominal or epigastric pain. No nausea, vomiting, or hematemesis; No diarrhea or constipation. No melena or hematochezia.  GENITOURINARY: No dysuria, frequency, hematuria, or incontinence  NEUROLOGICAL: No headaches, memory loss, loss of strength, numbness, or tremors  SKIN: No itching, burning, rashes, or lesions   LYMPH NODES: No enlarged glands  ENDOCRINE: No heat or cold intolerance; No hair loss  MUSCULOSKELETAL: No joint pain or swelling; No muscle, back, or extremity pain  PSYCHIATRIC: No depression, anxiety, mood swings, or difficulty sleeping  HEME/LYMPH: No easy bruising, or bleeding gums  ALLERY AND IMMUNOLOGIC: No hives or eczema    T(C): 36.7 (05-15-21 @ 11:22), Max: 37.3 (21 @ 20:16)  HR: 76 (05-15-21 @ 11:22) (76 - 103)  BP: 131/76 (05-15-21 @ 11:22) (102/65 - 131/76)  RR: 16 (05-15-21 @ 11:22) (16 - 17)  SpO2: 100% (05-15-21 @ 11:22) (98% - 100%)  Wt(kg): --  I&O's Summary    14 May 2021 07:01  -  15 May 2021 07:00  --------------------------------------------------------  IN: 2500 mL / OUT: 1700 mL / NET: 800 mL    15 May 2021 07:01  -  15 May 2021 13:08  --------------------------------------------------------  IN: 240 mL / OUT: 600 mL / NET: -360 mL        PHYSICAL EXAM:  GENERAL: NAD, laying comfortably in bed  HEAD:  Atraumatic, Normocephalic  EYES: EOMI, PERRLA, conjunctiva and sclera clear  ENMT: No tonsillar erythema, exudates, or enlargement; MMM  NECK: Supple, No JVD  NERVOUS SYSTEM:  Alert & Oriented X3, no focal deficits   CHEST/LUNG: Clear to percussion bilaterally; No rales, rhonchi, wheezing, or rubs  HEART: Regular rate and rhythm; No murmurs, rubs, or gallops  ABDOMEN: Soft, Nontender, Nondistended; Bowel sounds present  EXTREMITIES:  2+ Peripheral Pulses, No clubbing, cyanosis, or edema  LYMPH: No lymphadenopathy noted  SKIN: Right rectal region with gauze, no packing, with decreased tenderness or erythema at I&D      LABS:                        9.6    5.33  )-----------( 373      ( 15 May 2021 07:45 )             32.1     05-15    138  |  103  |  7   ----------------------------<  96  4.0   |  27  |  0.66    Ca    8.4      15 May 2021 07:45  Phos  4.1     05-15  Mg     1.9     05-15    TPro  9.3<H>  /  Alb  3.0<L>  /  TBili  0.4  /  DBili  x   /  AST  16  /  ALT  17  /  AlkPhos  88  05-13    PT/INR - ( 13 May 2021 14:43 )   PT: 17.5 sec;   INR: 1.49          PTT - ( 13 May 2021 14:43 )  PTT:33.9 sec  Urinalysis Basic - ( 13 May 2021 17:19 )    Color: Yellow / Appearance: Clear / S.010 / pH: x  Gluc: x / Ketone: Trace mg/dL  / Bili: NEGATIVE / Urobili: 0.2 E.U./dL   Blood: x / Protein: NEGATIVE mg/dL / Nitrite: NEGATIVE   Leuk Esterase: NEGATIVE / RBC: x / WBC x   Sq Epi: x / Non Sq Epi: x / Bacteria: x      CAPILLARY BLOOD GLUCOSE            Urinalysis Basic - ( 13 May 2021 17:19 )    Color: Yellow / Appearance: Clear / S.010 / pH: x  Gluc: x / Ketone: Trace mg/dL  / Bili: NEGATIVE / Urobili: 0.2 E.U./dL   Blood: x / Protein: NEGATIVE mg/dL / Nitrite: NEGATIVE   Leuk Esterase: NEGATIVE / RBC: x / WBC x   Sq Epi: x / Non Sq Epi: x / Bacteria: x        MEDICATIONS  (STANDING):  acetaminophen   Tablet .. 650 milliGRAM(s) Oral every 6 hours  cefTRIAXone   IVPB 1000 milliGRAM(s) IV Intermittent every 24 hours  melatonin 5 milliGRAM(s) Oral at bedtime  metroNIDAZOLE  IVPB      metroNIDAZOLE  IVPB 500 milliGRAM(s) IV Intermittent every 8 hours    MEDICATIONS  (PRN):  ondansetron    Tablet 4 milliGRAM(s) Oral every 6 hours PRN Nausea and/or Vomiting      RADIOLOGY & ADDITIONAL TESTS:    Imaging Personally Reviewed:  [ ] YES  [ ] NO    Consultant(s) Notes Reviewed:  [ ] YES  [ ] NO    Care Discussed with Consultants/Other Providers [ ] YES  [ ] NO Patient is a 26y old  Male who presents with a chief complaint of Perianal abscess (15 May 2021 06:21)      INTERVAL HPI/OVERNIGHT EVENTS:  Patient was seen and examined at bedside. As per nurse and patient, no o/n events, patient resting comfortably. Endorses improvement in pain to wound. Notes that he is glad he has follow up for his HIV and is ready to establish care. Patient denies: fever, chills, dizziness, weakness, HA, Changes in vision, CP, palpitations, SOB, cough, dysuria, changes in bowel movements, LE edema.      PAST MEDICAL & SURGICAL HISTORY:  HIV (human immunodeficiency virus infection)    Psoriasis    History of tonsillectomy  @ 7 years old    T(C): 36.7 (05-15-21 @ 11:22), Max: 37.3 (21 @ 20:16)  HR: 76 (05-15-21 @ 11:22) (76 - 103)  BP: 131/76 (05-15-21 @ 11:22) (102/65 - 131/76)  RR: 16 (05-15-21 @ 11:22) (16 - 17)  SpO2: 100% (05-15-21 @ 11:22) (98% - 100%)  Wt(kg): --  I&O's Summary    14 May 2021 07:  -  15 May 2021 07:00  --------------------------------------------------------  IN: 2500 mL / OUT: 1700 mL / NET: 800 mL    15 May 2021 07:  -  15 May 2021 13:08  --------------------------------------------------------  IN: 240 mL / OUT: 600 mL / NET: -360 mL        PHYSICAL EXAM:  GENERAL: NAD, laying comfortably in bed  HEAD:  Atraumatic, Normocephalic  EYES: EOMI, PERRLA, conjunctiva and sclera clear  ENMT: No tonsillar erythema, exudates, or enlargement; MMM  NECK: Supple, No JVD  NERVOUS SYSTEM:  Alert & Oriented X3, no focal deficits   CHEST/LUNG: Clear to percussion bilaterally; No rales, rhonchi, wheezing, or rubs  HEART: Regular rate and rhythm; No murmurs, rubs, or gallops  ABDOMEN: Soft, Nontender, Nondistended; Bowel sounds present  EXTREMITIES:  2+ Peripheral Pulses, No clubbing, cyanosis, or edema  LYMPH: No lymphadenopathy noted  SKIN: Right rectal region with gauze, no packing, with decreased tenderness or erythema at I&D      LABS:                        9.6    5.33  )-----------( 373      ( 15 May 2021 07:45 )             32.1     05-15    138  |  103  |  7   ----------------------------<  96  4.0   |  27  |  0.66    Ca    8.4      15 May 2021 07:45  Phos  4.1     05-15  Mg     1.9     05-15    TPro  9.3<H>  /  Alb  3.0<L>  /  TBili  0.4  /  DBili  x   /  AST  16  /  ALT  17  /  AlkPhos  88  05-13    PT/INR - ( 13 May 2021 14:43 )   PT: 17.5 sec;   INR: 1.49          PTT - ( 13 May 2021 14:43 )  PTT:33.9 sec  Urinalysis Basic - ( 13 May 2021 17:19 )    Color: Yellow / Appearance: Clear / S.010 / pH: x  Gluc: x / Ketone: Trace mg/dL  / Bili: NEGATIVE / Urobili: 0.2 E.U./dL   Blood: x / Protein: NEGATIVE mg/dL / Nitrite: NEGATIVE   Leuk Esterase: NEGATIVE / RBC: x / WBC x   Sq Epi: x / Non Sq Epi: x / Bacteria: x      CAPILLARY BLOOD GLUCOSE            Urinalysis Basic - ( 13 May 2021 17:19 )    Color: Yellow / Appearance: Clear / S.010 / pH: x  Gluc: x / Ketone: Trace mg/dL  / Bili: NEGATIVE / Urobili: 0.2 E.U./dL   Blood: x / Protein: NEGATIVE mg/dL / Nitrite: NEGATIVE   Leuk Esterase: NEGATIVE / RBC: x / WBC x   Sq Epi: x / Non Sq Epi: x / Bacteria: x        MEDICATIONS  (STANDING):  acetaminophen   Tablet .. 650 milliGRAM(s) Oral every 6 hours  cefTRIAXone   IVPB 1000 milliGRAM(s) IV Intermittent every 24 hours  melatonin 5 milliGRAM(s) Oral at bedtime  metroNIDAZOLE  IVPB      metroNIDAZOLE  IVPB 500 milliGRAM(s) IV Intermittent every 8 hours    MEDICATIONS  (PRN):  ondansetron    Tablet 4 milliGRAM(s) Oral every 6 hours PRN Nausea and/or Vomiting      RADIOLOGY & ADDITIONAL TESTS:    Imaging Personally Reviewed:  [ ] YES  [ ] NO    Consultant(s) Notes Reviewed:  [ ] YES  [ ] NO    Care Discussed with Consultants/Other Providers [ ] YES  [ ] NO

## 2021-05-15 NOTE — PROGRESS NOTE ADULT - ATTENDING COMMENTS
Pain ok.  No fevers  Drainage site without erythema    A/P: s/p I&D of perianal abscess. HIV positive  1. Sitz baths  2. dry gauze/pad for drainage  3. f/u with me next week  4. Has f/u with HIV clinic  5. Cefpodoxime/flagyl for 5 days
Feels better.  AFVSS since I&D  Right lateral I&D site clean, mild tenderness, minimal induration and erythema, no fluctuance.    A/P: s/p I&D of perirectal abscess, HIV positive not on medications  1. Discussed natural history of perirectal abscess, possibility of fistula formation.  2. IV ceftriaxone/flagyl.  3. Monitor temps.  4. Sitz baths  5. Needs connection to HIV clinic.

## 2021-05-15 NOTE — DISCHARGE NOTE NURSING/CASE MANAGEMENT/SOCIAL WORK - NSDCFUADDAPPT_GEN_ALL_CORE_FT
Please follow up with Dr. Joseph this Thursday, 5/20/2021. Call his office to schedule an appointment: [phone].    Please follow up with Dr. Sanchez within one week. Call the [facility] office to schedule an appointment: [phone]. Address listed below:   [address] [place] [address], [address] [place] [address]  [phone]  [email or web address]

## 2021-05-15 NOTE — PROGRESS NOTE ADULT - SUBJECTIVE AND OBJECTIVE BOX
HX: POD  s/p      SUBJECTIVE: Patient seen and examined bedside by chief resident.    cefTRIAXone   IVPB 1000 milliGRAM(s) IV Intermittent every 24 hours  metroNIDAZOLE  IVPB      metroNIDAZOLE  IVPB 500 milliGRAM(s) IV Intermittent every 8 hours    MEDICATIONS  (PRN):  ondansetron    Tablet 4 milliGRAM(s) Oral every 6 hours PRN Nausea and/or Vomiting      I&O's Detail    13 May 2021 07:01  -  14 May 2021 07:00  --------------------------------------------------------  IN:    IV PiggyBack: 100 mL    IV PiggyBack: 100 mL    Oral Fluid: 500 mL  Total IN: 700 mL    OUT:    Voided (mL): 0 mL  Total OUT: 0 mL    Total NET: 700 mL      14 May 2021 07:01  -  15 May 2021 06:21  --------------------------------------------------------  IN:    IV PiggyBack: 100 mL    Oral Fluid: 1800 mL  Total IN: 1900 mL    OUT:    Voided (mL): 1700 mL  Total OUT: 1700 mL    Total NET: 200 mL          Vital Signs Last 24 Hrs  T(C): 36.9 (15 May 2021 05:05), Max: 37.3 (14 May 2021 20:16)  T(F): 98.4 (15 May 2021 05:05), Max: 99.1 (14 May 2021 20:16)  HR: 100 (15 May 2021 05:05) (86 - 103)  BP: 108/68 (15 May 2021 05:05) (99/63 - 111/71)  BP(mean): --  RR: 17 (15 May 2021 05:05) (17 - 18)  SpO2: 98% (15 May 2021 05:05) (98% - 100%)    General: NAD, resting comfortably in bed  C/V: pulses present in b/l upper extremities   Pulm: Nonlabored breathing, no respiratory distress  Abd: soft, ND, NT, no rebound or guarding,   Extrem: WWP, no edema, SCDs in place    LABS:                        9.7    6.86  )-----------( 362      ( 14 May 2021 06:45 )             32.4         138  |  103  |  7   ----------------------------<  92  3.8   |  28  |  0.71    Ca    8.1<L>      14 May 2021 06:45  Phos  4.4       Mg     2.1         TPro  9.3<H>  /  Alb  3.0<L>  /  TBili  0.4  /  DBili  x   /  AST  16  /  ALT  17  /  AlkPhos  88      PT/INR - ( 13 May 2021 14:43 )   PT: 17.5 sec;   INR: 1.49          PTT - ( 13 May 2021 14:43 )  PTT:33.9 sec  Urinalysis Basic - ( 13 May 2021 17:19 )    Color: Yellow / Appearance: Clear / S.010 / pH: x  Gluc: x / Ketone: Trace mg/dL  / Bili: NEGATIVE / Urobili: 0.2 E.U./dL   Blood: x / Protein: NEGATIVE mg/dL / Nitrite: NEGATIVE   Leuk Esterase: NEGATIVE / RBC: x / WBC x   Sq Epi: x / Non Sq Epi: x / Bacteria: x        RADIOLOGY & ADDITIONAL STUDIES:  CT Abdomen and Pelvis w/ Oral Cont and w/ IV Cont:   EXAM:  CT ABDOMEN AND PELVIS OC IC                           PROCEDURE DATE:  2021          INTERPRETATION:  CT of the ABDOMEN and PELVIS with intravenous contrast dated 2021 5:24 PM    INDICATION: Rectal pain, evaluate for abscess/inflammation. HIV positive, not on medications.    TECHNIQUE: CT of the abdomen and pelvis with intravenous and oral contrast. Axial, sagittal, and coronal images were obtained and reviewed. 100 cc Omnipaque 350 intravenous contrast was administered; 5 cc was discarded.    COMPARISON: None.    FINDINGS:    Lower chest: Clear lung bases.    Liver: Smooth contour. Few subcentimeter hypodense lesions in the liver, too small to characterize.    Biliary system: Gallbladder is normal in size. No calcified gallstones. No biliary ductal dilatation.    Pancreas: Unremarkable.    Spleen: Mildly enlarged measuring 13.4 cm in length.    Adrenal glands: Unremarkable.    Kidneys: Symmetric parenchymal enhancement. No renal mass. No hydronephrosis. No renal or ureteral stone.    Bowel/Peritoneum: Rim-enhancing hypodense collection with adjacent fat stranding centered in right buttock soft tissues with suspected perianal fistulous communication, overall measures 2.5 x 3.1 x at least 2.8 cm (AP by transverse bycraniocaudal, noting caudal extent not completely imaged).  Large colonic fecal burden. No dilated small or large bowel loops. No ascites or extraluminal gas.    Pelvic organs: Unremarkable.    Lymph nodes: No lymphadenopathy.    Vascular: Normal caliber aorta.    Bones/Soft tissues: No suspicious lytic or blastic lesion. Mild multilevel superior and inferior central endplate depressions lower thoracic and lumbar spine probably degenerative etiology.      IMPRESSION:    1.  3.1 cm perianal abscess with suspected perianal fistulous tract. MRI perianal fistula protocol would be helpful for further characterization.  2.  Large colonic stool burden.  3.  Mild splenomegaly.          Thank you for the opportunity to participate in the care of this patient.    RANDY CHAIREZ M.D., RADIOLOGY RESIDENT  This document has been electronically signed.  WILLIAM ACOSTA MD, ATTENDING RADIOLOGIST  This document has been electronically signed. May 13 2021  7:14PM (21 @ 17:24)        Plan:   Diet:   IVF:   Abx:   Pain/Nausea:   Home medications:   AM labs       HX: POD2 bedside I&D     SUBJECTIVE:   decreased buttock pain and minimal drainage   -N/-V, feli. regular diet  +OOBA  +voiding     cefTRIAXone   IVPB 1000 milliGRAM(s) IV Intermittent every 24 hours  metroNIDAZOLE  IVPB      metroNIDAZOLE  IVPB 500 milliGRAM(s) IV Intermittent every 8 hours    MEDICATIONS  (PRN):  ondansetron    Tablet 4 milliGRAM(s) Oral every 6 hours PRN Nausea and/or Vomiting      I&O's Detail    13 May 2021 07:01  -  14 May 2021 07:00  --------------------------------------------------------  IN:    IV PiggyBack: 100 mL    IV PiggyBack: 100 mL    Oral Fluid: 500 mL  Total IN: 700 mL    OUT:    Voided (mL): 0 mL  Total OUT: 0 mL    Total NET: 700 mL      14 May 2021 07:01  -  15 May 2021 06:21  --------------------------------------------------------  IN:    IV PiggyBack: 100 mL    Oral Fluid: 1800 mL  Total IN: 1900 mL    OUT:    Voided (mL): 1700 mL  Total OUT: 1700 mL    Total NET: 200 mL      Vital Signs Last 24 Hrs  T(C): 36.9 (15 May 2021 05:05), Max: 37.3 (14 May 2021 20:16)  T(F): 98.4 (15 May 2021 05:05), Max: 99.1 (14 May 2021 20:16)  HR: 100 (15 May 2021 05:05) (86 - 103)  BP: 108/68 (15 May 2021 05:05) (99/63 - 111/71)  RR: 17 (15 May 2021 05:05) (17 - 18)  SpO2: 98% (15 May 2021 05:05) (98% - 100%)    General: NAD, resting comfortably in bed  Pulm: Nonlabored breathing, no respiratory distress on RA   Abd: soft, ND, NT, no rebound or guarding  Buttock: decreased erythema and induration at I&D site, packing removed and replaced with wet to dry gauze  Extrem: WWP, no edema    LABS:     CBC Full  -  ( 15 May 2021 07:45 )  WBC Count : 5.33 K/uL  RBC Count : 4.18 M/uL  Hemoglobin : 9.6 g/dL  Hematocrit : 32.1 %  Platelet Count - Automated : 373 K/uL  Mean Cell Volume : 76.8 fl  Mean Cell Hemoglobin : 23.0 pg  Mean Cell Hemoglobin Concentration : 29.9 gm/dL  Auto Neutrophil # : x  Auto Lymphocyte # : x  Auto Monocyte # : x  Auto Eosinophil # : x  Auto Basophil # : x  Auto Neutrophil % : x  Auto Lymphocyte % : x  Auto Monocyte % : x  Auto Eosinophil % : x  Auto Basophil % : x    05-15    138  |  103  |  7   ----------------------------<  96  4.0   |  27  |  0.66    Ca    8.4      15 May 2021 07:45  Phos  4.1     05-15  Mg     1.9     05-15      A/P:  27 yo M w/ PMH of HIV (off medications for more than a year) and PSH of tonsillectomy at 7 years old presents to [facility] ED w/ 4 days history of perianal pain and 2 day history of constipation. In the ED he has T of 100.7 F,  bpm, /92 mmHg, Sa 96% on RA. WBC 8.88, H/H 9.7/31.5, INR 1.49, Albumin 3. CT A/P w/ PO and IV contrast demonstrated 3.1 cm perianal abscess w/ suspected perianal fistulous tract and large colonic stool burden. Now s/p bedside I&D of perianal abscess (5/13).    - Regular diet  - Pain control  - SCDs  - Metronidazole, Ceftriaxone  - AM labs  - f/u HIV Team  - OOBA/IS      Plan discussed with attending and chief resident.   ____________________________________________________  Tayler Knight MD     PGY1 - Surgery  HX: POD2 bedside I&D     SUBJECTIVE:   decreased buttock pain and minimal drainage   -N/-V, feli. regular diet  +OOBA  +voiding     cefTRIAXone   IVPB 1000 milliGRAM(s) IV Intermittent every 24 hours  metroNIDAZOLE  IVPB      metroNIDAZOLE  IVPB 500 milliGRAM(s) IV Intermittent every 8 hours    MEDICATIONS  (PRN):  ondansetron    Tablet 4 milliGRAM(s) Oral every 6 hours PRN Nausea and/or Vomiting      I&O's Detail    13 May 2021 07:01  -  14 May 2021 07:00  --------------------------------------------------------  IN:    IV PiggyBack: 100 mL    IV PiggyBack: 100 mL    Oral Fluid: 500 mL  Total IN: 700 mL    OUT:    Voided (mL): 0 mL  Total OUT: 0 mL    Total NET: 700 mL      14 May 2021 07:01  -  15 May 2021 06:21  --------------------------------------------------------  IN:    IV PiggyBack: 100 mL    Oral Fluid: 1800 mL  Total IN: 1900 mL    OUT:    Voided (mL): 1700 mL  Total OUT: 1700 mL    Total NET: 200 mL      Vital Signs Last 24 Hrs  T(C): 36.9 (15 May 2021 05:05), Max: 37.3 (14 May 2021 20:16)  T(F): 98.4 (15 May 2021 05:05), Max: 99.1 (14 May 2021 20:16)  HR: 100 (15 May 2021 05:05) (86 - 103)  BP: 108/68 (15 May 2021 05:05) (99/63 - 111/71)  RR: 17 (15 May 2021 05:05) (17 - 18)  SpO2: 98% (15 May 2021 05:05) (98% - 100%)    General: NAD, resting comfortably in bed  Pulm: Nonlabored breathing, no respiratory distress on RA   Abd: soft, ND, NT, no rebound or guarding  Buttock: decreased erythema and induration at I&D site, packing removed and replaced with wet to dry gauze  Extrem: WWP, no edema    LABS:     CBC Full  -  ( 15 May 2021 07:45 )  WBC Count : 5.33 K/uL  RBC Count : 4.18 M/uL  Hemoglobin : 9.6 g/dL  Hematocrit : 32.1 %  Platelet Count - Automated : 373 K/uL  Mean Cell Volume : 76.8 fl  Mean Cell Hemoglobin : 23.0 pg  Mean Cell Hemoglobin Concentration : 29.9 gm/dL  Auto Neutrophil # : x  Auto Lymphocyte # : x  Auto Monocyte # : x  Auto Eosinophil # : x  Auto Basophil # : x  Auto Neutrophil % : x  Auto Lymphocyte % : x  Auto Monocyte % : x  Auto Eosinophil % : x  Auto Basophil % : x    05-15    138  |  103  |  7   ----------------------------<  96  4.0   |  27  |  0.66    Ca    8.4      15 May 2021 07:45  Phos  4.1     05-15  Mg     1.9     05-15      A/P:  27 yo M w/ PMH of HIV (off medications for more than a year) and PSH of tonsillectomy at 7 years old presents to [facility] ED w/ 4 days history of perianal pain and 2 day history of constipation. In the ED he has T of 100.7 F,  bpm, /92 mmHg, Sa 96% on RA. WBC 8.88, H/H 9.7/31.5, INR 1.49, Albumin 3. CT A/P w/ PO and IV contrast demonstrated 3.1 cm perianal abscess w/ suspected perianal fistulous tract and large colonic stool burden. Now s/p bedside I&D of perianal abscess (5/13).    - Regular diet  - Pain control  - SCDs  - Metronidazole, Ceftriaxone  - AM labs  - f/u HIV Team  - OOBA/IS  - Discharge home today     Plan discussed with attending and chief resident.   ____________________________________________________  Tayler Knight MD     PGY1 - Surgery

## 2021-05-15 NOTE — DISCHARGE NOTE PROVIDER - NSDCFUADDINST_GEN_ALL_CORE_FT
General Discharge Instructions:  Please resume all regular home medications unless specifically advised not to take a particular medication. Also, please take any new medications as prescribed.  NEW MEDICATIONS:   Cefpodoxime  Flagyl     Please get plenty of rest, continue to ambulate several times per day, and drink adequate amounts of fluids. Avoid lifting weights greater than 5-10 lbs until you follow-up with your surgeon, who will instruct you further regarding activity restrictions.    Avoid driving or operating heavy machinery while taking pain medications.    Please follow-up with your surgeon and Primary Care Provider (PCP) as advised.    Incision Care:  *Please call your doctor or nurse practitioner if you have increased pain, swelling, redness, or drainage from the incision site.  *Avoid swimming and baths until your follow-up appointment.  *You may shower, and wash surgical incisions with a mild soap and warm water. Gently pat the area dry.  *If you have staples, they will be removed at your follow-up appointment.  *You may change the dressing daily and cover with gauze and tape.     Warning Signs:  Please call your doctor or nurse practitioner if you experience the following:  *You experience new chest pain, pressure, squeezing or tightness.  *New or worsening cough, shortness of breath, or wheeze.  *If you are vomiting and cannot keep down fluids or your medications.  *You are getting dehydrated due to continued vomiting, diarrhea, or other reasons. Signs of dehydration include dry mouth, rapid heartbeat, or feeling dizzy or faint when standing.  *You see blood or dark/black material when you vomit or have a bowel movement.  *You experience burning when you urinate, have blood in your urine, or experience a discharge.  *Your pain is not improving within 8-12 hours or is not gone within 24 hours. Call or return immediately if your pain is getting worse, changes location, or moves to your chest or back.  *You have shaking chills, or fever greater than 101.5 degrees Fahrenheit or 38 degrees Celsius.  *Any change in your symptoms, or any new symptoms that concern you. General Discharge Instructions:  Please resume all regular home medications unless specifically advised not to take a particular medication. Also, please take any new medications as prescribed.  NEW MEDICATIONS:   Cefpodoxime  Flagyl     Take Tylenol as needed for pain. Oxycodone with colace as needed for severe pain not controlled with Tylenol.     Please get plenty of rest, continue to ambulate several times per day, and drink adequate amounts of fluids. Avoid lifting weights greater than 5-10 lbs until you follow-up with your surgeon, who will instruct you further regarding activity restrictions.    Avoid driving or operating heavy machinery while taking pain medications.    Please follow-up with your surgeon and Primary Care Provider (PCP) as advised.    Incision Care:  *Please call your doctor or nurse practitioner if you have increased pain, swelling, redness, or drainage from the incision site.  *Avoid swimming and baths until your follow-up appointment.  *You may shower, and wash surgical incisions with a mild soap and warm water. Gently pat the area dry.  *If you have staples, they will be removed at your follow-up appointment.  *You may change the dressing daily and cover with gauze and tape.     Warning Signs:  Please call your doctor or nurse practitioner if you experience the following:  *You experience new chest pain, pressure, squeezing or tightness.  *New or worsening cough, shortness of breath, or wheeze.  *If you are vomiting and cannot keep down fluids or your medications.  *You are getting dehydrated due to continued vomiting, diarrhea, or other reasons. Signs of dehydration include dry mouth, rapid heartbeat, or feeling dizzy or faint when standing.  *You see blood or dark/black material when you vomit or have a bowel movement.  *You experience burning when you urinate, have blood in your urine, or experience a discharge.  *Your pain is not improving within 8-12 hours or is not gone within 24 hours. Call or return immediately if your pain is getting worse, changes location, or moves to your chest or back.  *You have shaking chills, or fever greater than 101.5 degrees Fahrenheit or 38 degrees Celsius.  *Any change in your symptoms, or any new symptoms that concern you.

## 2021-05-15 NOTE — DISCHARGE NOTE PROVIDER - NSDCFUADDAPPT_GEN_ALL_CORE_FT
Please follow up with Dr. Joseph this Thursday, 5/20/2021. Call his office to schedule an appointment: [phone].    Please follow up with Dr. Sanchez within one week. Call the [facility] office to schedule an appointment: [phone]. Address listed below:   [address] [place] [address], [address] [place] [address]  [phone]  [email or web address]

## 2021-05-15 NOTE — DISCHARGE NOTE PROVIDER - CARE PROVIDER_API CALL
Floresita Joseph)  ColonRectal Surgery; Surgery  [address] [place] [address], [address]  [place] [address]  Phone: [phone]  Fax: [phone]  Follow Up Time: 1 week

## 2021-05-15 NOTE — DISCHARGE NOTE PROVIDER - NSDCMRMEDTOKEN_GEN_ALL_CORE_FT
Adderall XR 5 mg oral capsule, extended release: 1 cap(s) orally once a day (in the morning), As Needed  cefpodoxime 200 mg oral tablet: 2 tab(s) orally every 12 hours MDD:800 mg for skin/soft tissue infection (perirectal abscess)  Flagyl 500 mg oral tablet: 1 tab(s) orally every 8 hours MDD:3 tabs (1500mg) for skin/soft tissue infection (perirectal abscess)    Adderall XR 5 mg oral capsule, extended release: 1 cap(s) orally once a day (in the morning), As Needed  cefpodoxime 200 mg oral tablet: 2 tab(s) orally every 12 hours MDD:800 mg for skin/soft tissue infection (perirectal abscess)  Colace 100 mg oral capsule: 1 cap(s) orally 2 times a day with oxycodone to avoid constipation   Flagyl 500 mg oral tablet: 1 tab(s) orally every 8 hours MDD:3 tabs (1500mg) for skin/soft tissue infection (perirectal abscess)   oxyCODONE 5 mg oral tablet: 1 tab(s) orally every 6 hours MDD:3 tabs as needed for severe pain

## 2021-05-15 NOTE — DISCHARGE NOTE PROVIDER - NSDCCPTREATMENT_GEN_ALL_CORE_FT
PRINCIPAL PROCEDURE  Procedure: Incision and drainage, abscess, perianal  Findings and Treatment:

## 2021-05-15 NOTE — DISCHARGE NOTE PROVIDER - HOSPITAL COURSE
27 yo M w/ PMH of HIV (off medications for more than a year) and PSH of tonsillectomy at 7 years old presents to [facility] ED w/ 4 days history of perianal pain and 2 day history of constipation. The patient states that he tends to get bowel movement every other day and is still passing gas. The patient denies having history of easy bruising or bleeding disorders. In the ED he has VS of 100.7 F,  bpm, /92 mmHg, Sa 96% on RA. WBC 8.88, H/H 9.7/31.5, INR 1.49, Albumin 3. CT A/P w/ PO and IV contrast demonstrated 3.1 cm perianal abscess w/ suspected perianal fistulous tract and large colonic stool burden. The patient was accepted for the transfer to [facility] Team 4.    5/13: I&D of perianal abscess     O/N: -N/-V, +F/-BM, ambulates, pain is well-controlled, -ve urine cx., 5/15 blood cx. NGTD  5/14: HIV team consulted, GC swab of anus, plan to restart antiretroviral next week outpt . afebrile. SBP 99, given 1L bolus.   O/N: admitted, bedside I&D of perianal absces, surgical swab cx. w/ Gram -ve rods, coccobacilli and Gram +ve cocci in pairs   27 yo M w/ PMH of HIV (off medications for more than a year) and PSH of tonsillectomy at 7 years old who presented to [facility] ED w/ 4 days history of perianal pain and 2 day history of constipation. In the ED, VS of 100.7 F,  bpm, /92 mmHg, Sa 96% on RA. WBC 8.88, H/H 9.7/31.5, INR 1.49, Albumin 3. CT A/P w/ PO and IV contrast demonstrated 3.1 cm perianal abscess w/ suspected perianal fistulous tract and large colonic stool burden. On admission he underwent a bedside I&D of perianal abscess and received IV antibiotics. He was seen by the HIV infectious disease team who made recommendations for follow up. The patient recovered well and on the day of discharge was tolerating a diet with signficantly decreased perianal pain.  He was discharged with antibiotics and pain medications and short-term follow up with both the surgery and HIV teams.

## 2021-05-15 NOTE — PROGRESS NOTE ADULT - PROBLEM SELECTOR PLAN 1
Plan per primary team   CW IV abx, awaiting final culture data, currently with GNR, gram negative coccobacilli, Gram positive cocci in pairs  Pain control.

## 2021-05-15 NOTE — DISCHARGE NOTE PROVIDER - NSDCCPCAREPLAN_GEN_ALL_CORE_FT
PRINCIPAL DISCHARGE DIAGNOSIS  Diagnosis: Perianal abscess  Assessment and Plan of Treatment:       SECONDARY DISCHARGE DIAGNOSES  Diagnosis: HIV (human immunodeficiency virus infection)  Assessment and Plan of Treatment: HIV (human immunodeficiency virus infection)

## 2021-05-15 NOTE — DISCHARGE NOTE NURSING/CASE MANAGEMENT/SOCIAL WORK - PATIENT PORTAL LINK FT
You can access the FollowMyHealth Patient Portal offered by [facility] by registering at the following website: [email or web address]. By joining [facility]’s FollowMyHealth portal, you will also be able to view your health information using other applications (apps) compatible with our system.

## 2021-05-16 LAB
C TRACH RRNA SPEC QL NAA+PROBE: DETECTED
C TRACH+GC RRNA ANAL QL PROBE: SIGNIFICANT CHANGE UP
CHLAMYDIA/N. GONORRHEA, ANAL/RECTAL, TMA - SOURCE ANAL: SIGNIFICANT CHANGE UP
GAMMA INTERFERON BACKGROUND BLD IA-ACNC: 0.08 IU/ML — SIGNIFICANT CHANGE UP
M TB IFN-G BLD-IMP: NEGATIVE — SIGNIFICANT CHANGE UP
M TB IFN-G CD4+ BCKGRND COR BLD-ACNC: 0 IU/ML — SIGNIFICANT CHANGE UP
M TB IFN-G CD4+CD8+ BCKGRND COR BLD-ACNC: 0 IU/ML — SIGNIFICANT CHANGE UP
N GONORRHOEA RRNA SPEC QL NAA+PROBE: DETECTED
QUANT TB PLUS MITOGEN MINUS NIL: 4.09 IU/ML — SIGNIFICANT CHANGE UP

## 2021-05-17 LAB
-  AMPICILLIN/SULBACTAM: SIGNIFICANT CHANGE UP
-  AMPICILLIN: SIGNIFICANT CHANGE UP
-  CEFAZOLIN: SIGNIFICANT CHANGE UP
-  CEFTRIAXONE: SIGNIFICANT CHANGE UP
-  CIPROFLOXACIN: SIGNIFICANT CHANGE UP
-  ERTAPENEM: SIGNIFICANT CHANGE UP
-  GENTAMICIN: SIGNIFICANT CHANGE UP
-  PIPERACILLIN/TAZOBACTAM: SIGNIFICANT CHANGE UP
-  TOBRAMYCIN: SIGNIFICANT CHANGE UP
-  TRIMETHOPRIM/SULFAMETHOXAZOLE: SIGNIFICANT CHANGE UP
4/8 RATIO: 0.38 RATIO — LOW (ref 0.9–3.6)
ABS CD8: 672 /UL — SIGNIFICANT CHANGE UP (ref 142–740)
CD3 BLASTS SPEC-ACNC: 80 % — SIGNIFICANT CHANGE UP (ref 59–83)
CD3 BLASTS SPEC-ACNC: 980 /UL — SIGNIFICANT CHANGE UP (ref 672–1870)
CD4 %: 21 % — LOW (ref 30–62)
CD8 %: 55 % — HIGH (ref 12–36)
METHOD TYPE: SIGNIFICANT CHANGE UP
RPR SER-TITR: (no result)
RPR SERPL-ACNC: REACTIVE
T PALLIDUM AB TITR SER: POSITIVE
T-CELL CD4 SUBSET PNL BLD: 257 /UL — LOW (ref 489–1457)

## 2021-05-18 LAB
-  CEFAZOLIN: SIGNIFICANT CHANGE UP
-  CEFAZOLIN: SIGNIFICANT CHANGE UP
-  CLINDAMYCIN: SIGNIFICANT CHANGE UP
-  CLINDAMYCIN: SIGNIFICANT CHANGE UP
-  DAPTOMYCIN: SIGNIFICANT CHANGE UP
-  ERYTHROMYCIN: SIGNIFICANT CHANGE UP
-  ERYTHROMYCIN: SIGNIFICANT CHANGE UP
-  LINEZOLID: SIGNIFICANT CHANGE UP
-  LINEZOLID: SIGNIFICANT CHANGE UP
-  OXACILLIN: SIGNIFICANT CHANGE UP
-  OXACILLIN: SIGNIFICANT CHANGE UP
-  RIFAMPIN: SIGNIFICANT CHANGE UP
-  RIFAMPIN: SIGNIFICANT CHANGE UP
-  TETRACYCLINE: SIGNIFICANT CHANGE UP
-  TRIMETHOPRIM/SULFAMETHOXAZOLE: SIGNIFICANT CHANGE UP
-  VANCOMYCIN: SIGNIFICANT CHANGE UP
-  VANCOMYCIN: SIGNIFICANT CHANGE UP
CULTURE RESULTS: SIGNIFICANT CHANGE UP
METHOD TYPE: SIGNIFICANT CHANGE UP
ORGANISM # SPEC MICROSCOPIC CNT: SIGNIFICANT CHANGE UP
ORGANISM # SPEC MICROSCOPIC CNT: SIGNIFICANT CHANGE UP
SPECIMEN SOURCE: SIGNIFICANT CHANGE UP

## 2021-05-19 ENCOUNTER — NON-APPOINTMENT (OUTPATIENT)
Age: 26
End: 2021-05-19

## 2021-05-19 LAB
CULTURE RESULTS: SIGNIFICANT CHANGE UP
HIV-1 VIRAL LOAD RESULT: ABNORMAL
HIV1 RNA # SERPL NAA+PROBE: SIGNIFICANT CHANGE UP
HIV1 RNA SER-IMP: SIGNIFICANT CHANGE UP
HIV1 RNA SERPL NAA+PROBE-ACNC: ABNORMAL
HIV1 RNA SERPL NAA+PROBE-LOG#: 5.71 — SIGNIFICANT CHANGE UP
ORGANISM # SPEC MICROSCOPIC CNT: SIGNIFICANT CHANGE UP
SPECIMEN SOURCE: SIGNIFICANT CHANGE UP

## 2021-05-19 RX ORDER — BICTEGRAVIR SODIUM, EMTRICITABINE, AND TENOFOVIR ALAFENAMIDE FUMARATE 30; 120; 15 MG/1; MG/1; MG/1
1 TABLET ORAL
Qty: 30 | Refills: 0
Start: 2021-05-19 | End: 2021-06-17

## 2021-05-19 RX ORDER — BICTEGRAVIR SODIUM, EMTRICITABINE, AND TENOFOVIR ALAFENAMIDE FUMARATE 30; 120; 15 MG/1; MG/1; MG/1
1 TABLET ORAL
Qty: 30 | Refills: 0
Start: 2021-05-19 | End: 2021-12-11

## 2021-05-19 NOTE — CHART NOTE - NSCHARTNOTEFT_GEN_A_CORE
Patient showed up at [facility] Office at 4:45pm on 5/19.  Unable to register / see patient officially.  Discussed GC/Chlamydia and syphilis tests.  Sent doxycycline and Biktarvy to pharmacy on file.  Appointment scheduled for next week for treatment of other STDs.  Advised to abstain from sex.    SW reached out to patient -- advised to keep [facility] and not switch to [facility].

## 2021-05-20 PROBLEM — B20 HUMAN IMMUNODEFICIENCY VIRUS [HIV] DISEASE: Chronic | Status: ACTIVE | Noted: 2021-05-13

## 2021-05-20 PROBLEM — Z00.00 ENCOUNTER FOR PREVENTIVE HEALTH EXAMINATION: Status: ACTIVE | Noted: 2021-05-20

## 2021-05-20 PROBLEM — L40.9 PSORIASIS, UNSPECIFIED: Chronic | Status: ACTIVE | Noted: 2021-05-13

## 2021-05-21 ENCOUNTER — NON-APPOINTMENT (OUTPATIENT)
Age: 26
End: 2021-05-21

## 2021-05-26 ENCOUNTER — APPOINTMENT (OUTPATIENT)
Dept: INFECTIOUS DISEASE | Facility: CLINIC | Age: 26
End: 2021-05-26
Payer: COMMERCIAL

## 2021-05-26 ENCOUNTER — OUTPATIENT (OUTPATIENT)
Dept: OUTPATIENT SERVICES | Facility: HOSPITAL | Age: 26
LOS: 1 days | End: 2021-05-26

## 2021-05-26 ENCOUNTER — NON-APPOINTMENT (OUTPATIENT)
Age: 26
End: 2021-05-26

## 2021-05-26 VITALS
RESPIRATION RATE: 16 BRPM | TEMPERATURE: 99 F | OXYGEN SATURATION: 98 % | HEIGHT: 69 IN | HEART RATE: 132 BPM | DIASTOLIC BLOOD PRESSURE: 83 MMHG | BODY MASS INDEX: 19.7 KG/M2 | WEIGHT: 133 LBS | SYSTOLIC BLOOD PRESSURE: 141 MMHG

## 2021-05-26 DIAGNOSIS — Z59.9 PROBLEM RELATED TO HOUSING AND ECONOMIC CIRCUMSTANCES, UNSPECIFIED: ICD-10-CM

## 2021-05-26 DIAGNOSIS — A53.0 LATENT SYPHILIS, UNSPECIFIED AS EARLY OR LATE: ICD-10-CM

## 2021-05-26 DIAGNOSIS — Z90.89 ACQUIRED ABSENCE OF OTHER ORGANS: Chronic | ICD-10-CM

## 2021-05-26 DIAGNOSIS — B20 HUMAN IMMUNODEFICIENCY VIRUS [HIV] DISEASE: ICD-10-CM

## 2021-05-26 DIAGNOSIS — K61.2 ANORECTAL ABSCESS: ICD-10-CM

## 2021-05-26 DIAGNOSIS — D64.9 ANEMIA, UNSPECIFIED: ICD-10-CM

## 2021-05-26 DIAGNOSIS — F17.200 NICOTINE DEPENDENCE, UNSPECIFIED, UNCOMPLICATED: ICD-10-CM

## 2021-05-26 DIAGNOSIS — A56.3 CHLAMYDIAL INFECTION OF ANUS AND RECTUM: ICD-10-CM

## 2021-05-26 DIAGNOSIS — Z72.89 OTHER PROBLEMS RELATED TO LIFESTYLE: ICD-10-CM

## 2021-05-26 DIAGNOSIS — A54.6 GONOCOCCAL INFECTION OF ANUS AND RECTUM: ICD-10-CM

## 2021-05-26 DIAGNOSIS — F19.90 OTHER PSYCHOACTIVE SUBSTANCE USE, UNSPECIFIED, UNCOMPLICATED: ICD-10-CM

## 2021-05-26 DIAGNOSIS — F41.9 ANXIETY DISORDER, UNSPECIFIED: ICD-10-CM

## 2021-05-26 LAB
A1C WITH ESTIMATED AVERAGE GLUCOSE RESULT: 5.6 % — SIGNIFICANT CHANGE UP (ref 4–5.6)
ALBUMIN SERPL ELPH-MCNC: 3.7 G/DL — SIGNIFICANT CHANGE UP (ref 3.3–5)
ALP SERPL-CCNC: 87 U/L — SIGNIFICANT CHANGE UP (ref 40–120)
ALT FLD-CCNC: 19 U/L — SIGNIFICANT CHANGE UP (ref 10–45)
ANION GAP SERPL CALC-SCNC: 9 MMOL/L — SIGNIFICANT CHANGE UP (ref 5–17)
APPEARANCE UR: CLEAR — SIGNIFICANT CHANGE UP
AST SERPL-CCNC: 21 U/L — SIGNIFICANT CHANGE UP (ref 10–40)
BACTERIA # UR AUTO: PRESENT /HPF
BASOPHILS # BLD AUTO: 0.02 K/UL — SIGNIFICANT CHANGE UP (ref 0–0.2)
BASOPHILS NFR BLD AUTO: 0.2 % — SIGNIFICANT CHANGE UP (ref 0–2)
BILIRUB SERPL-MCNC: 0.2 MG/DL — SIGNIFICANT CHANGE UP (ref 0.2–1.2)
BILIRUB UR-MCNC: NEGATIVE — SIGNIFICANT CHANGE UP
BUN SERPL-MCNC: 16 MG/DL — SIGNIFICANT CHANGE UP (ref 7–23)
CALCIUM SERPL-MCNC: 8.9 MG/DL — SIGNIFICANT CHANGE UP (ref 8.4–10.5)
CHLORIDE SERPL-SCNC: 99 MMOL/L — SIGNIFICANT CHANGE UP (ref 96–108)
CHOLEST SERPL-MCNC: 113 MG/DL — SIGNIFICANT CHANGE UP
CO2 SERPL-SCNC: 29 MMOL/L — SIGNIFICANT CHANGE UP (ref 22–31)
COD CRY URNS QL: ABNORMAL /HPF
COLOR SPEC: YELLOW — SIGNIFICANT CHANGE UP
COMMENT - URINE: SIGNIFICANT CHANGE UP
CREAT SERPL-MCNC: 0.83 MG/DL — SIGNIFICANT CHANGE UP (ref 0.5–1.3)
DIFF PNL FLD: NEGATIVE — SIGNIFICANT CHANGE UP
EOSINOPHIL # BLD AUTO: 0.25 K/UL — SIGNIFICANT CHANGE UP (ref 0–0.5)
EOSINOPHIL NFR BLD AUTO: 2.8 % — SIGNIFICANT CHANGE UP (ref 0–6)
ESTIMATED AVERAGE GLUCOSE: 114 MG/DL — SIGNIFICANT CHANGE UP (ref 68–114)
FERRITIN SERPL-MCNC: 178 NG/ML — SIGNIFICANT CHANGE UP (ref 30–400)
GLUCOSE SERPL-MCNC: 65 MG/DL — LOW (ref 70–99)
GLUCOSE UR QL: NEGATIVE — SIGNIFICANT CHANGE UP
HBV CORE AB SER-ACNC: SIGNIFICANT CHANGE UP
HBV SURFACE AB SER-ACNC: REACTIVE
HBV SURFACE AG SER-ACNC: SIGNIFICANT CHANGE UP
HCT VFR BLD CALC: 32.7 % — LOW (ref 39–50)
HCV AB S/CO SERPL IA: 0.03 S/CO — SIGNIFICANT CHANGE UP
HCV AB SERPL-IMP: SIGNIFICANT CHANGE UP
HDLC SERPL-MCNC: 35 MG/DL — LOW
HGB BLD-MCNC: 9.5 G/DL — LOW (ref 13–17)
IMM GRANULOCYTES NFR BLD AUTO: 0.2 % — SIGNIFICANT CHANGE UP (ref 0–1.5)
IRON SATN MFR SERPL: 14 % — LOW (ref 16–55)
IRON SATN MFR SERPL: 33 UG/DL — LOW (ref 45–165)
KETONES UR-MCNC: ABNORMAL MG/DL
LEUKOCYTE ESTERASE UR-ACNC: NEGATIVE — SIGNIFICANT CHANGE UP
LIPID PNL WITH DIRECT LDL SERPL: 56 MG/DL — SIGNIFICANT CHANGE UP
LYMPHOCYTES # BLD AUTO: 1.8 K/UL — SIGNIFICANT CHANGE UP (ref 1–3.3)
LYMPHOCYTES # BLD AUTO: 20.2 % — SIGNIFICANT CHANGE UP (ref 13–44)
MCHC RBC-ENTMCNC: 23.1 PG — LOW (ref 27–34)
MCHC RBC-ENTMCNC: 29.1 GM/DL — LOW (ref 32–36)
MCV RBC AUTO: 79.6 FL — LOW (ref 80–100)
MONOCYTES # BLD AUTO: 0.65 K/UL — SIGNIFICANT CHANGE UP (ref 0–0.9)
MONOCYTES NFR BLD AUTO: 7.3 % — SIGNIFICANT CHANGE UP (ref 2–14)
NEUTROPHILS # BLD AUTO: 6.18 K/UL — SIGNIFICANT CHANGE UP (ref 1.8–7.4)
NEUTROPHILS NFR BLD AUTO: 69.3 % — SIGNIFICANT CHANGE UP (ref 43–77)
NITRITE UR-MCNC: NEGATIVE — SIGNIFICANT CHANGE UP
NON HDL CHOLESTEROL: 78 MG/DL — SIGNIFICANT CHANGE UP
NRBC # BLD: 0 /100 WBCS — SIGNIFICANT CHANGE UP (ref 0–0)
PH UR: 6.5 — SIGNIFICANT CHANGE UP (ref 5–8)
PLATELET # BLD AUTO: 503 K/UL — HIGH (ref 150–400)
POTASSIUM SERPL-MCNC: 4.2 MMOL/L — SIGNIFICANT CHANGE UP (ref 3.5–5.3)
POTASSIUM SERPL-SCNC: 4.2 MMOL/L — SIGNIFICANT CHANGE UP (ref 3.5–5.3)
PROT SERPL-MCNC: 8.6 G/DL — HIGH (ref 6–8.3)
PROT UR-MCNC: 30 MG/DL
RBC # BLD: 4.11 M/UL — LOW (ref 4.2–5.8)
RBC # FLD: 16.5 % — HIGH (ref 10.3–14.5)
RBC CASTS # UR COMP ASSIST: < 5 /HPF — SIGNIFICANT CHANGE UP
SODIUM SERPL-SCNC: 137 MMOL/L — SIGNIFICANT CHANGE UP (ref 135–145)
SP GR SPEC: 1.02 — SIGNIFICANT CHANGE UP (ref 1–1.03)
TIBC SERPL-MCNC: 242 UG/DL — SIGNIFICANT CHANGE UP (ref 220–430)
TRANSFERRIN SERPL-MCNC: 219 MG/DL — SIGNIFICANT CHANGE UP (ref 200–360)
TRIGL SERPL-MCNC: 111 MG/DL — SIGNIFICANT CHANGE UP
UIBC SERPL-MCNC: 209 UG/DL — SIGNIFICANT CHANGE UP (ref 110–370)
UROBILINOGEN FLD QL: 2 E.U./DL
WBC # BLD: 8.92 K/UL — SIGNIFICANT CHANGE UP (ref 3.8–10.5)
WBC # FLD AUTO: 8.92 K/UL — SIGNIFICANT CHANGE UP (ref 3.8–10.5)
WBC UR QL: < 5 /HPF — SIGNIFICANT CHANGE UP

## 2021-05-26 PROCEDURE — 99205 OFFICE O/P NEW HI 60 MIN: CPT

## 2021-05-26 RX ORDER — PENICILLIN G BENZATHINE 2400000 [IU]/4ML
2400000 INJECTION, SUSPENSION INTRAMUSCULAR
Qty: 1 | Refills: 0 | Status: COMPLETED | OUTPATIENT
Start: 2021-05-26

## 2021-05-26 RX ORDER — CEFTRIAXONE 500 MG/1
500 INJECTION, POWDER, FOR SOLUTION INTRAMUSCULAR; INTRAVENOUS
Qty: 1 | Refills: 0 | Status: COMPLETED | OUTPATIENT
Start: 2021-05-26

## 2021-05-26 RX ORDER — BICTEGRAVIR SODIUM, EMTRICITABINE, AND TENOFOVIR ALAFENAMIDE FUMARATE 50; 200; 25 MG/1; MG/1; MG/1
50-200-25 TABLET ORAL
Qty: 30 | Refills: 1 | Status: ACTIVE | COMMUNITY
Start: 2021-05-26 | End: 1900-01-01

## 2021-05-26 RX ADMIN — PENICILLIN G BENZATHINE 4 UNIT/4ML: 2400000 INJECTION, SUSPENSION INTRAMUSCULAR at 00:00

## 2021-05-26 RX ADMIN — CEFTRIAXONE 1 MG: 500 INJECTION, POWDER, FOR SOLUTION INTRAMUSCULAR; INTRAVENOUS at 00:00

## 2021-05-26 SDOH — ECONOMIC STABILITY - INCOME SECURITY: PROBLEM RELATED TO HOUSING AND ECONOMIC CIRCUMSTANCES, UNSPECIFIED: Z59.9

## 2021-05-27 ENCOUNTER — APPOINTMENT (OUTPATIENT)
Dept: COLORECTAL SURGERY | Facility: CLINIC | Age: 26
End: 2021-05-27

## 2021-05-27 DIAGNOSIS — D64.9 ANEMIA, UNSPECIFIED: ICD-10-CM

## 2021-05-27 DIAGNOSIS — K61.2 ANORECTAL ABSCESS: ICD-10-CM

## 2021-05-27 DIAGNOSIS — Z72.89 OTHER PROBLEMS RELATED TO LIFESTYLE: ICD-10-CM

## 2021-05-27 DIAGNOSIS — A56.3 CHLAMYDIAL INFECTION OF ANUS AND RECTUM: ICD-10-CM

## 2021-05-27 DIAGNOSIS — F17.200 NICOTINE DEPENDENCE, UNSPECIFIED, UNCOMPLICATED: ICD-10-CM

## 2021-05-27 DIAGNOSIS — A53.0 LATENT SYPHILIS, UNSPECIFIED AS EARLY OR LATE: ICD-10-CM

## 2021-05-27 DIAGNOSIS — A54.6 GONOCOCCAL INFECTION OF ANUS AND RECTUM: ICD-10-CM

## 2021-05-27 DIAGNOSIS — F19.90 OTHER PSYCHOACTIVE SUBSTANCE USE, UNSPECIFIED, UNCOMPLICATED: ICD-10-CM

## 2021-05-27 DIAGNOSIS — F41.9 ANXIETY DISORDER, UNSPECIFIED: ICD-10-CM

## 2021-05-27 DIAGNOSIS — Z59.9 PROBLEM RELATED TO HOUSING AND ECONOMIC CIRCUMSTANCES, UNSPECIFIED: ICD-10-CM

## 2021-05-27 DIAGNOSIS — B20 HUMAN IMMUNODEFICIENCY VIRUS [HIV] DISEASE: ICD-10-CM

## 2021-05-27 LAB
4/8 RATIO: 0.3 RATIO — LOW (ref 0.9–3.6)
ABS CD8: 886 /UL — HIGH (ref 142–740)
CD3 BLASTS SPEC-ACNC: 1210 /UL — SIGNIFICANT CHANGE UP (ref 672–1870)
CD3 BLASTS SPEC-ACNC: 80 % — SIGNIFICANT CHANGE UP (ref 59–83)
CD4 %: 17 % — LOW (ref 30–62)
CD8 %: 58 % — HIGH (ref 12–36)
HAV IGG SER QL IA: SIGNIFICANT CHANGE UP
T-CELL CD4 SUBSET PNL BLD: 263 /UL — LOW (ref 489–1457)

## 2021-05-27 SDOH — ECONOMIC STABILITY - INCOME SECURITY: PROBLEM RELATED TO HOUSING AND ECONOMIC CIRCUMSTANCES, UNSPECIFIED: Z59.9

## 2021-05-28 LAB
GAMMA INTERFERON BACKGROUND BLD IA-ACNC: 0.05 IU/ML — SIGNIFICANT CHANGE UP
HIV-1 VIRAL LOAD RESULT: ABNORMAL
HIV1 RNA # SERPL NAA+PROBE: SIGNIFICANT CHANGE UP
HIV1 RNA SER-IMP: SIGNIFICANT CHANGE UP
HIV1 RNA SERPL NAA+PROBE-ACNC: ABNORMAL
HIV1 RNA SERPL NAA+PROBE-LOG#: 5.53 — SIGNIFICANT CHANGE UP
M TB IFN-G BLD-IMP: NEGATIVE — SIGNIFICANT CHANGE UP
M TB IFN-G CD4+ BCKGRND COR BLD-ACNC: 0 IU/ML — SIGNIFICANT CHANGE UP
M TB IFN-G CD4+CD8+ BCKGRND COR BLD-ACNC: 0 IU/ML — SIGNIFICANT CHANGE UP
QUANT TB PLUS MITOGEN MINUS NIL: 5.57 IU/ML — SIGNIFICANT CHANGE UP

## 2021-05-30 LAB
BICTEGRAVIR RESISTANCE: SIGNIFICANT CHANGE UP
DOLUTEGRAVIR ISLT GENOTYP: SIGNIFICANT CHANGE UP
ELVITEGRAVIR ISLT GENOTYP: SIGNIFICANT CHANGE UP
HIV 1 RNA IN SERPLBLD-SEQ: SIGNIFICANT CHANGE UP
HIV 1 RNA RT + PR + IN SERPLBLD-SEQ: DETECTED
HIV RT+PR MUT TESTED ISLT: SIGNIFICANT CHANGE UP
HIV-1 GENOTYPE DRUG RESISTANCE - RESULT: DETECTED
HIV-1 INTEGRASE GENOTYPE - RESULT: SIGNIFICANT CHANGE UP
RALTEGRAVIR ISLT GENOTYP: SIGNIFICANT CHANGE UP
VIRAL LOAD DATE: SIGNIFICANT CHANGE UP

## 2021-06-23 ENCOUNTER — RX RENEWAL (OUTPATIENT)
Age: 26
End: 2021-06-23

## 2021-06-29 ENCOUNTER — NON-APPOINTMENT (OUTPATIENT)
Age: 26
End: 2021-06-29

## 2021-07-06 ENCOUNTER — TRANSCRIPTION ENCOUNTER (OUTPATIENT)
Age: 26
End: 2021-07-06

## 2021-07-06 LAB
BICTEGRAVIR RESISTANCE: SIGNIFICANT CHANGE UP
DOLUTEGRAVIR ISLT GENOTYP: SIGNIFICANT CHANGE UP
ELVITEGRAVIR ISLT GENOTYP: SIGNIFICANT CHANGE UP
RALTEGRAVIR ISLT GENOTYP: SIGNIFICANT CHANGE UP

## 2021-07-20 ENCOUNTER — NON-APPOINTMENT (OUTPATIENT)
Age: 26
End: 2021-07-20

## 2021-08-10 ENCOUNTER — NON-APPOINTMENT (OUTPATIENT)
Age: 26
End: 2021-08-10

## 2021-08-12 ENCOUNTER — NON-APPOINTMENT (OUTPATIENT)
Age: 26
End: 2021-08-12

## 2021-08-16 ENCOUNTER — APPOINTMENT (OUTPATIENT)
Dept: INFECTIOUS DISEASE | Facility: CLINIC | Age: 26
End: 2021-08-16

## 2021-08-16 ENCOUNTER — NON-APPOINTMENT (OUTPATIENT)
Age: 26
End: 2021-08-16

## 2021-11-10 ENCOUNTER — INPATIENT (INPATIENT)
Facility: HOSPITAL | Age: 26
LOS: 1 days | Discharge: HOME | DRG: 975 | End: 2021-11-12
Attending: SURGERY | Admitting: SURGERY
Payer: MEDICAID

## 2021-11-10 VITALS
RESPIRATION RATE: 16 BRPM | HEART RATE: 92 BPM | DIASTOLIC BLOOD PRESSURE: 88 MMHG | OXYGEN SATURATION: 99 % | HEIGHT: 69 IN | SYSTOLIC BLOOD PRESSURE: 125 MMHG | TEMPERATURE: 98 F

## 2021-11-10 DIAGNOSIS — Z90.89 ACQUIRED ABSENCE OF OTHER ORGANS: Chronic | ICD-10-CM

## 2021-11-10 LAB
APPEARANCE UR: CLEAR — SIGNIFICANT CHANGE UP
BILIRUB UR-MCNC: NEGATIVE — SIGNIFICANT CHANGE UP
COLOR SPEC: YELLOW — SIGNIFICANT CHANGE UP
DIFF PNL FLD: NEGATIVE — SIGNIFICANT CHANGE UP
GLUCOSE UR QL: NEGATIVE — SIGNIFICANT CHANGE UP
KETONES UR-MCNC: NEGATIVE — SIGNIFICANT CHANGE UP
LEUKOCYTE ESTERASE UR-ACNC: NEGATIVE — SIGNIFICANT CHANGE UP
NITRITE UR-MCNC: NEGATIVE — SIGNIFICANT CHANGE UP
PH UR: 7 — SIGNIFICANT CHANGE UP (ref 5–8)
PROT UR-MCNC: NEGATIVE MG/DL — SIGNIFICANT CHANGE UP
SARS-COV-2 RNA SPEC QL NAA+PROBE: NEGATIVE — SIGNIFICANT CHANGE UP
SP GR SPEC: <=1.005 — SIGNIFICANT CHANGE UP (ref 1–1.03)
UROBILINOGEN FLD QL: 0.2 E.U./DL — SIGNIFICANT CHANGE UP

## 2021-11-10 PROCEDURE — 74177 CT ABD & PELVIS W/CONTRAST: CPT | Mod: 26,MC

## 2021-11-10 PROCEDURE — 99285 EMERGENCY DEPT VISIT HI MDM: CPT

## 2021-11-10 RX ORDER — CEFTRIAXONE 500 MG/1
1000 INJECTION, POWDER, FOR SOLUTION INTRAMUSCULAR; INTRAVENOUS ONCE
Refills: 0 | Status: COMPLETED | OUTPATIENT
Start: 2021-11-10 | End: 2021-11-10

## 2021-11-10 RX ORDER — METRONIDAZOLE 500 MG
500 TABLET ORAL EVERY 8 HOURS
Refills: 0 | Status: DISCONTINUED | OUTPATIENT
Start: 2021-11-10 | End: 2021-11-11

## 2021-11-10 RX ORDER — CEFTRIAXONE 500 MG/1
1000 INJECTION, POWDER, FOR SOLUTION INTRAMUSCULAR; INTRAVENOUS EVERY 24 HOURS
Refills: 0 | Status: DISCONTINUED | OUTPATIENT
Start: 2021-11-11 | End: 2021-11-11

## 2021-11-10 RX ORDER — ACETAMINOPHEN 500 MG
1000 TABLET ORAL ONCE
Refills: 0 | Status: DISCONTINUED | OUTPATIENT
Start: 2021-11-10 | End: 2021-11-12

## 2021-11-10 RX ORDER — HEPARIN SODIUM 5000 [USP'U]/ML
5000 INJECTION INTRAVENOUS; SUBCUTANEOUS EVERY 8 HOURS
Refills: 0 | Status: DISCONTINUED | OUTPATIENT
Start: 2021-11-10 | End: 2021-11-12

## 2021-11-10 RX ORDER — ONDANSETRON 8 MG/1
4 TABLET, FILM COATED ORAL EVERY 6 HOURS
Refills: 0 | Status: DISCONTINUED | OUTPATIENT
Start: 2021-11-10 | End: 2021-11-12

## 2021-11-10 RX ORDER — SODIUM CHLORIDE 9 MG/ML
1000 INJECTION, SOLUTION INTRAVENOUS
Refills: 0 | Status: DISCONTINUED | OUTPATIENT
Start: 2021-11-10 | End: 2021-11-11

## 2021-11-10 RX ADMIN — SODIUM CHLORIDE 100 MILLILITER(S): 9 INJECTION, SOLUTION INTRAVENOUS at 21:50

## 2021-11-10 RX ADMIN — HEPARIN SODIUM 5000 UNIT(S): 5000 INJECTION INTRAVENOUS; SUBCUTANEOUS at 22:46

## 2021-11-10 RX ADMIN — CEFTRIAXONE 100 MILLIGRAM(S): 500 INJECTION, POWDER, FOR SOLUTION INTRAMUSCULAR; INTRAVENOUS at 21:50

## 2021-11-10 RX ADMIN — Medication 100 MILLIGRAM(S): at 22:27

## 2021-11-10 NOTE — ED PROVIDER NOTE - CLINICAL SUMMARY MEDICAL DECISION MAKING FREE TEXT BOX
25 y/o M HIV positive currently not on antivirals presents to ED with c/o bloody    diarrhea and abdominal pain. Lower abdomen was mildly tender on exam. Plan for labs (including CD4 count), CT abdomen and pelvis, IV fluids, IV Tylenol and Pepcid, and stool sample if pt is able to provide. 27 y/o M HIV positive currently not on antivirals presents to ED with c/o bloody    diarrhea and abdominal pain. Lower abdomen was mildly tender on exam. Plan for labs (including CD4 count), CT abdomen and pelvis, IV fluids, IV Tylenol and Pepcid, and stool sample if pt is able to provide.    CT with colitis ? intussusception on CT - seen by surgery and will admit.

## 2021-11-10 NOTE — H&P ADULT - NSHPPHYSICALEXAM_GEN_ALL_CORE
GEN: NAD, resting comfortably   CV: NSR   Pulm: no respiratory distress on RA   Abd: soft, ND, mild TTP suprapubic region, no rebound or guarding, SUHA: no blood, no masses, brown stool in rectal vault  Ext: WWP, no edema   Neuro: motor/sensory grossly intact

## 2021-11-10 NOTE — H&P ADULT - ASSESSMENT
27yo M with PMH HIV (off meds since July, unknown viral load) who presents to ED with 4d abdominal pain. Afebrile, HD stable, WBC wnl, CT with short segment jejunojejunal intussusception bowel wall thickening involving the rectosigmoid colon and ascending colon. Concerning for colitis with likely incidental intussusception.     NPO/IVF   Pain/nausea control PRN  Ceftriaxone/Flagyl (11/10-)  Stool studies  Serial abdominal exams   OOBA/SCDs/SQH/IS   AM Labs  GI consult   HIV team consult in AM   Repeat/interval CT in AM    Plan discussed with attending and chief resident.   ____________________________________________________   Antonia - Resident   Surgery  25yo M with PMH HIV (off meds since July, unknown viral load) who presents to ED with 4d abdominal pain. Afebrile, HD stable, WBC wnl, CT with short segment jejunojejunal intussusception bowel wall thickening involving the rectosigmoid colon and ascending colon. Concerning for colitis with likely incidental intussusception.     NPO/IVF   Pain/nausea control PRN  Ceftriaxone/Flagyl (11/10-)  Stool studies  Serial abdominal exams   OOBA/SCDs/SQH/IS   AM Labs  GI consult   HIV team consult in AM   Repeat/interval CT in AM    Plan discussed with attending and chief resident.   ____________________________________________________  BENITA Knight - Resident   Surgery     senior resident addendum:  agree with HPI, assessment and plan as above. patient has been having diarrhea for at least 2 days, none yet in ED, and has been having persistent flatus. He denies fevers at home, no urinary symptoms. no symptoms of obstruction.     abdomen soft, nondistended, mild tenderness to left lower and mid quadrant. nonperitonitic    iv fluid hydration and bowel rest overnight for colitis.  repeat CT in AM for intussusception (likely incidental finding)

## 2021-11-10 NOTE — ED PROVIDER NOTE - CONSTITUTIONAL, MLM
Well appearing, awake, alert, oriented to person, place, time/situation and is mildly discomfortable. normal...

## 2021-11-10 NOTE — H&P ADULT - ATTENDING COMMENTS
Patient seen and examined, CT reviewed.  He is MSM, has had gonorrhea and chlamydia proctitis in the past.  Pain improved.  AFVSS  Mild TTP infraumbilically, no guarding.    labs ok    CT demonstrates proctocolitis.  2cm small bowel intussusception.    A/P: Proctocolitis in setting of untreated HIV.  DDx includes CMV, bacterial, IBD.  Incidental intussusception unrelated to symptoms.  1. Stop antibiotics  2. clear liquids  3. Stool cultures  4. flexible sigmoidoscopy  5. HIV team evaluation to re-establish care.    Dr. Robledo taking over service tomorrow.

## 2021-11-10 NOTE — H&P ADULT - HISTORY OF PRESENT ILLNESS
25yo M with PMH HIV (off meds since July, unknown viral load) who presents to ED with 4d abdominal pain. Described pain as starting gradually in the lower abdomen 4 days ago, progressively worsened, 2 days ago associated with diarrhea, 1x BRBPR, now with improving pain and diarrhea. Endorses some nausea and body aches. Denies fevers, CP, SOB, emesis, changes in urinary habits. Never c-scope or EGD.     In ED, afebrile, HD stable, WBC wnl, CT with short segment of the jejunojejunal intussusception within the right hemiabdomen may represent transient intussusception. Bowel wall thickening involving the rectosigmoid colon and ascending colon, possible colitis. Mild to moderate right hydroureter without obstructive lesion. Underdistended urinary bladder limiting evaluation, possible bladder wall thickening.     PMH: ADHD, HIV (off meds since July, previously Biktarvy)   PSH: perianal abscess I&D (2021)  Meds: None  All: NKDA   SHX: current smoker, no ETOH, +marijuana, cocaine, meth, ketamine, undominciled stays with friends and family, sometimes sleeps/stays on street, unemployed and partially self-employed  FHX: no CRC, no IBD, DM

## 2021-11-10 NOTE — ED ADULT NURSE NOTE - OBJECTIVE STATEMENT
26y male c/o abdominal pain w/ diarrhea x 4 days. pt HIV positive and has not taken his antiviral meds for the past two months. pt unsure if there is blood in his stool. subjective fevers. intermittent nausea. denies CP, SOB, headache, dizziness, numbness, tingling. Patient is awake and alert. Alert and oriented x 4. ambulated in ED w/ steady gait. No acute distress noted at this time. blood work sent to lab.    LAte charting-pt was evaluated during downtime.

## 2021-11-10 NOTE — H&P ADULT - NSHPLABSRESULTS_GEN_ALL_CORE
13.0   4.86  )-----------( 287      ( 10 Nov 2021 17:31 )             40.1     11-10    136  |  99  |  10  ----------------------------<  85  3.6   |  26  |  0.71    Ca    8.6      10 Nov 2021 17:27    TPro  7.8  /  Alb  3.6  /  TBili  0.2  /  DBili  x   /  AST  22  /  ALT  53<H>  /  AlkPhos  82  11-10    LIVER FUNCTIONS - ( 10 Nov 2021 17:27 )  Alb: 3.6 g/dL / Pro: 7.8 g/dL / ALK PHOS: 82 U/L / ALT: 53 U/L / AST: 22 U/L / GGT: x             Urinalysis Basic - ( 10 Nov 2021 19:30 )    Color: Yellow / Appearance: Clear / SG: <=1.005 / pH: x  Gluc: x / Ketone: NEGATIVE  / Bili: Negative / Urobili: 0.2 E.U./dL   Blood: x / Protein: NEGATIVE mg/dL / Nitrite: NEGATIVE   Leuk Esterase: NEGATIVE / RBC: x / WBC x   Sq Epi: x / Non Sq Epi: x / Bacteria: x       < from: CT Abdomen and Pelvis w/ Oral Cont and w/ IV Cont (11.10.21 @ 18:53) >      EXAM:  CT ABDOMEN AND PELVIS OC IC                          PROCEDURE DATE:  11/10/2021          INTERPRETATION:  CT SCAN OF ABDOMEN AND PELVIS    History: Lower abdominal pain and diarrhea.    Technique: CT scan of abdomen and pelvis was performedfrom lung bases through symphysis pubis. Intravenous and oral contrast material were utilized. Axial, sagittal and coronal reformatted images were reviewed.    Comparison: CT abdomen and pelvis from 5/13/2021.    Findings:    Lower chest: Lung bases are clear. No pleural or pericardial effusion.    Liver: Few subcentimeter hypodensities are too small to characterize.    Gallbladder: Within normal limits. No radiopaque stones.    Spleen: Borderline splenomegaly.    Pancreas: Within normal limits.    Adrenal glands: Within normal limits.    Kidneys and ureters: Dilatation of the right ureter to 1.4 cm without ureteral calculus. No urolithiasis or hydronephrosis.    Lymph nodes: Within normal limits.    Ascites: No ascites.    GI tract and peritoneum: A 2.2 cm segment of jejunojejunal intussusception is noted within the right hemiabdomen (series 3 image 60 and series 5 image 37). Bowel wall thickening involving the rectum and sigmoid colon. No intra-abdominal fluid collection or free air. Normal morphology of the stomach and duodenum. No bowel obstruction. Normal appendix.    Vasculature: Within normal limits.    Pelvic organs: Wall thickening of the urinary bladder.    Soft tissues: Interval resolution of previously identified perianal abscess.    Bones: Within normal limits.      Impression:  1.  Short segment of the jejunojejunal intussusception within the right hemiabdomen may represent transient intussusception. Short interval follow-up with repeat CT abdomen and pelvis likely of benefit as clinically indicated.  2.  Bowel wall thickening involving the rectosigmoid colon and ascending colon, possible colitis.  3.  Mild to moderate right hydroureter without obstructive lesion. Underdistended urinary bladder limiting evaluation, possible bladder wall thickening. Correlation for cystitis and/or further evaluation for right ureteral obstructive lesion is recommended.    --- End of Report ---          Thank you for the opportunity to participate in the care of this patient.    ROYCE CUEVAS MD; Resident Radiologist  This document has been electronically signed.  RENNY PERAZA MD; Attending Radiologist  This document has been electronically signed. Nov 10 2021  7:31PM    < end of copied text >

## 2021-11-11 ENCOUNTER — TRANSCRIPTION ENCOUNTER (OUTPATIENT)
Age: 26
End: 2021-11-11

## 2021-11-11 ENCOUNTER — NON-APPOINTMENT (OUTPATIENT)
Age: 26
End: 2021-11-11

## 2021-11-11 LAB
4/8 RATIO: 0.38 RATIO — LOW (ref 0.9–3.6)
ABS CD8: 590 /UL — SIGNIFICANT CHANGE UP (ref 142–740)
ANION GAP SERPL CALC-SCNC: 9 MMOL/L — SIGNIFICANT CHANGE UP (ref 5–17)
BUN SERPL-MCNC: 6 MG/DL — LOW (ref 7–23)
C DIFF GDH STL QL: NEGATIVE — SIGNIFICANT CHANGE UP
C DIFF GDH STL QL: SIGNIFICANT CHANGE UP
CALCIUM SERPL-MCNC: 8.3 MG/DL — LOW (ref 8.4–10.5)
CD3 BLASTS SPEC-ACNC: 75 % — SIGNIFICANT CHANGE UP (ref 59–83)
CD3 BLASTS SPEC-ACNC: 848 /UL — SIGNIFICANT CHANGE UP (ref 672–1870)
CD4 %: 20 % — LOW (ref 30–62)
CD8 %: 53 % — HIGH (ref 12–36)
CHLORIDE SERPL-SCNC: 101 MMOL/L — SIGNIFICANT CHANGE UP (ref 96–108)
CO2 SERPL-SCNC: 28 MMOL/L — SIGNIFICANT CHANGE UP (ref 22–31)
COVID-19 NUCLEOCAPSID GAM AB INTERP: NEGATIVE — SIGNIFICANT CHANGE UP
COVID-19 NUCLEOCAPSID TOTAL GAM ANTIBODY RESULT: 0.09 INDEX — SIGNIFICANT CHANGE UP
COVID-19 SPIKE DOMAIN AB INTERP: POSITIVE
COVID-19 SPIKE DOMAIN ANTIBODY RESULT: >250 U/ML — HIGH
CREAT SERPL-MCNC: 0.9 MG/DL — SIGNIFICANT CHANGE UP (ref 0.5–1.3)
CULTURE RESULTS: NO GROWTH — SIGNIFICANT CHANGE UP
CULTURE RESULTS: SIGNIFICANT CHANGE UP
GLUCOSE SERPL-MCNC: 82 MG/DL — SIGNIFICANT CHANGE UP (ref 70–99)
HCT VFR BLD CALC: 34.7 % — LOW (ref 39–50)
HGB BLD-MCNC: 11.3 G/DL — LOW (ref 13–17)
INR BLD: 1.1 — SIGNIFICANT CHANGE UP (ref 0.88–1.16)
MAGNESIUM SERPL-MCNC: 1.6 MG/DL — SIGNIFICANT CHANGE UP (ref 1.6–2.6)
MCHC RBC-ENTMCNC: 28.6 PG — SIGNIFICANT CHANGE UP (ref 27–34)
MCHC RBC-ENTMCNC: 32.6 GM/DL — SIGNIFICANT CHANGE UP (ref 32–36)
MCV RBC AUTO: 87.8 FL — SIGNIFICANT CHANGE UP (ref 80–100)
NRBC # BLD: 0 /100 WBCS — SIGNIFICANT CHANGE UP (ref 0–0)
PHOSPHATE SERPL-MCNC: 2.3 MG/DL — LOW (ref 2.5–4.5)
PLATELET # BLD AUTO: 247 K/UL — SIGNIFICANT CHANGE UP (ref 150–400)
POTASSIUM SERPL-MCNC: 3.7 MMOL/L — SIGNIFICANT CHANGE UP (ref 3.5–5.3)
POTASSIUM SERPL-SCNC: 3.7 MMOL/L — SIGNIFICANT CHANGE UP (ref 3.5–5.3)
PROTHROM AB SERPL-ACNC: 13.1 SEC — SIGNIFICANT CHANGE UP (ref 10.6–13.6)
RBC # BLD: 3.95 M/UL — LOW (ref 4.2–5.8)
RBC # FLD: 12.3 % — SIGNIFICANT CHANGE UP (ref 10.3–14.5)
SARS-COV-2 IGG+IGM SERPL QL IA: 0.09 INDEX — SIGNIFICANT CHANGE UP
SARS-COV-2 IGG+IGM SERPL QL IA: >250 U/ML — HIGH
SARS-COV-2 IGG+IGM SERPL QL IA: NEGATIVE — SIGNIFICANT CHANGE UP
SARS-COV-2 IGG+IGM SERPL QL IA: POSITIVE
SODIUM SERPL-SCNC: 138 MMOL/L — SIGNIFICANT CHANGE UP (ref 135–145)
SPECIMEN SOURCE: SIGNIFICANT CHANGE UP
SPECIMEN SOURCE: SIGNIFICANT CHANGE UP
T-CELL CD4 SUBSET PNL BLD: 224 /UL — LOW (ref 489–1457)
WBC # BLD: 3.88 K/UL — SIGNIFICANT CHANGE UP (ref 3.8–10.5)
WBC # FLD AUTO: 3.88 K/UL — SIGNIFICANT CHANGE UP (ref 3.8–10.5)

## 2021-11-11 PROCEDURE — 86803 HEPATITIS C AB TEST: CPT

## 2021-11-11 PROCEDURE — 85730 THROMBOPLASTIN TIME PARTIAL: CPT

## 2021-11-11 PROCEDURE — 87070 CULTURE OTHR SPECIMN AEROBIC: CPT

## 2021-11-11 PROCEDURE — 87635 SARS-COV-2 COVID-19 AMP PRB: CPT

## 2021-11-11 PROCEDURE — 87075 CULTR BACTERIA EXCEPT BLOOD: CPT

## 2021-11-11 PROCEDURE — 87040 BLOOD CULTURE FOR BACTERIA: CPT

## 2021-11-11 PROCEDURE — 86780 TREPONEMA PALLIDUM: CPT

## 2021-11-11 PROCEDURE — 87491 CHLMYD TRACH DNA AMP PROBE: CPT

## 2021-11-11 PROCEDURE — 87536 HIV-1 QUANT&REVRSE TRNSCRPJ: CPT

## 2021-11-11 PROCEDURE — 86901 BLOOD TYPING SEROLOGIC RH(D): CPT

## 2021-11-11 PROCEDURE — 87340 HEPATITIS B SURFACE AG IA: CPT

## 2021-11-11 PROCEDURE — 86592 SYPHILIS TEST NON-TREP QUAL: CPT

## 2021-11-11 PROCEDURE — 99285 EMERGENCY DEPT VISIT HI MDM: CPT | Mod: 25

## 2021-11-11 PROCEDURE — 87591 N.GONORRHOEAE DNA AMP PROB: CPT

## 2021-11-11 PROCEDURE — 87901 NFCT AGT GNTYP ALYS HIV1 REV: CPT

## 2021-11-11 PROCEDURE — 99223 1ST HOSP IP/OBS HIGH 75: CPT

## 2021-11-11 PROCEDURE — 80048 BASIC METABOLIC PNL TOTAL CA: CPT

## 2021-11-11 PROCEDURE — 85027 COMPLETE CBC AUTOMATED: CPT

## 2021-11-11 PROCEDURE — 86360 T CELL ABSOLUTE COUNT/RATIO: CPT

## 2021-11-11 PROCEDURE — 83605 ASSAY OF LACTIC ACID: CPT

## 2021-11-11 PROCEDURE — 86850 RBC ANTIBODY SCREEN: CPT

## 2021-11-11 PROCEDURE — 71045 X-RAY EXAM CHEST 1 VIEW: CPT

## 2021-11-11 PROCEDURE — 86900 BLOOD TYPING SEROLOGIC ABO: CPT

## 2021-11-11 PROCEDURE — 87900 PHENOTYPE INFECT AGENT DRUG: CPT

## 2021-11-11 PROCEDURE — 85610 PROTHROMBIN TIME: CPT

## 2021-11-11 PROCEDURE — 86704 HEP B CORE ANTIBODY TOTAL: CPT

## 2021-11-11 PROCEDURE — 36415 COLL VENOUS BLD VENIPUNCTURE: CPT

## 2021-11-11 PROCEDURE — 87906 NFCT AGT GNTYP ALYS HIV1: CPT

## 2021-11-11 PROCEDURE — 80053 COMPREHEN METABOLIC PANEL: CPT

## 2021-11-11 PROCEDURE — 87186 SC STD MICRODIL/AGAR DIL: CPT

## 2021-11-11 PROCEDURE — 74177 CT ABD & PELVIS W/CONTRAST: CPT

## 2021-11-11 PROCEDURE — 86359 T CELLS TOTAL COUNT: CPT

## 2021-11-11 PROCEDURE — 87181 SC STD AGAR DILUTION PER AGT: CPT

## 2021-11-11 PROCEDURE — 87086 URINE CULTURE/COLONY COUNT: CPT

## 2021-11-11 PROCEDURE — 84100 ASSAY OF PHOSPHORUS: CPT

## 2021-11-11 PROCEDURE — 85025 COMPLETE CBC W/AUTO DIFF WBC: CPT

## 2021-11-11 PROCEDURE — 86480 TB TEST CELL IMMUN MEASURE: CPT

## 2021-11-11 PROCEDURE — 86706 HEP B SURFACE ANTIBODY: CPT

## 2021-11-11 PROCEDURE — 99222 1ST HOSP IP/OBS MODERATE 55: CPT | Mod: GC

## 2021-11-11 PROCEDURE — 86593 SYPHILIS TEST NON-TREP QUANT: CPT

## 2021-11-11 PROCEDURE — 86769 SARS-COV-2 COVID-19 ANTIBODY: CPT

## 2021-11-11 PROCEDURE — 83735 ASSAY OF MAGNESIUM: CPT

## 2021-11-11 PROCEDURE — 81003 URINALYSIS AUTO W/O SCOPE: CPT

## 2021-11-11 RX ORDER — BICTEGRAVIR SODIUM, EMTRICITABINE, AND TENOFOVIR ALAFENAMIDE FUMARATE 30; 120; 15 MG/1; MG/1; MG/1
1 TABLET ORAL DAILY
Refills: 0 | Status: DISCONTINUED | OUTPATIENT
Start: 2021-11-11 | End: 2021-11-12

## 2021-11-11 RX ORDER — MAGNESIUM SULFATE 500 MG/ML
2 VIAL (ML) INJECTION EVERY 6 HOURS
Refills: 0 | Status: COMPLETED | OUTPATIENT
Start: 2021-11-11 | End: 2021-11-11

## 2021-11-11 RX ORDER — CEFTRIAXONE 500 MG/1
2000 INJECTION, POWDER, FOR SOLUTION INTRAMUSCULAR; INTRAVENOUS EVERY 24 HOURS
Refills: 0 | Status: DISCONTINUED | OUTPATIENT
Start: 2021-11-11 | End: 2021-11-12

## 2021-11-11 RX ORDER — POTASSIUM PHOSPHATE, MONOBASIC POTASSIUM PHOSPHATE, DIBASIC 236; 224 MG/ML; MG/ML
15 INJECTION, SOLUTION INTRAVENOUS ONCE
Refills: 0 | Status: COMPLETED | OUTPATIENT
Start: 2021-11-11 | End: 2021-11-11

## 2021-11-11 RX ORDER — INFLUENZA VIRUS VACCINE 15; 15; 15; 15 UG/.5ML; UG/.5ML; UG/.5ML; UG/.5ML
0.5 SUSPENSION INTRAMUSCULAR ONCE
Refills: 0 | Status: COMPLETED | OUTPATIENT
Start: 2021-11-11 | End: 2021-11-11

## 2021-11-11 RX ORDER — IOHEXOL 300 MG/ML
30 INJECTION, SOLUTION INTRAVENOUS ONCE
Refills: 0 | Status: COMPLETED | OUTPATIENT
Start: 2021-11-11 | End: 2021-11-11

## 2021-11-11 RX ADMIN — Medication 50 GRAM(S): at 14:44

## 2021-11-11 RX ADMIN — HEPARIN SODIUM 5000 UNIT(S): 5000 INJECTION INTRAVENOUS; SUBCUTANEOUS at 13:26

## 2021-11-11 RX ADMIN — HEPARIN SODIUM 5000 UNIT(S): 5000 INJECTION INTRAVENOUS; SUBCUTANEOUS at 06:19

## 2021-11-11 RX ADMIN — Medication 50 GRAM(S): at 09:49

## 2021-11-11 RX ADMIN — Medication 100 MILLIGRAM(S): at 05:55

## 2021-11-11 RX ADMIN — HEPARIN SODIUM 5000 UNIT(S): 5000 INJECTION INTRAVENOUS; SUBCUTANEOUS at 21:43

## 2021-11-11 RX ADMIN — BICTEGRAVIR SODIUM, EMTRICITABINE, AND TENOFOVIR ALAFENAMIDE FUMARATE 1 TABLET(S): 30; 120; 15 TABLET ORAL at 21:43

## 2021-11-11 RX ADMIN — CEFTRIAXONE 100 MILLIGRAM(S): 500 INJECTION, POWDER, FOR SOLUTION INTRAMUSCULAR; INTRAVENOUS at 12:26

## 2021-11-11 RX ADMIN — IOHEXOL 30 MILLILITER(S): 300 INJECTION, SOLUTION INTRAVENOUS at 06:20

## 2021-11-11 RX ADMIN — POTASSIUM PHOSPHATE, MONOBASIC POTASSIUM PHOSPHATE, DIBASIC 62.5 MILLIMOLE(S): 236; 224 INJECTION, SOLUTION INTRAVENOUS at 10:41

## 2021-11-11 NOTE — DISCHARGE NOTE PROVIDER - PROVIDER TOKENS
FREE:[LAST:[[facility]],PHONE:[phone]],FAX:[phone]],ADDRESS:[[address] [place] [address], [place] [address]]] PROVIDER:[TOKEN:[[id number]]],FREE:[LAST:[[facility]],PHONE:[phone]],FAX:[phone]],ADDRESS:[[address] [place] [address]]],PROVIDER:[TOKEN:[[id number]]] PROVIDER:[TOKEN:[[id number]]],PROVIDER:[TOKEN:[[id number]]],FREE:[LAST:[[facility]],PHONE:[phone]],FAX:[(   [phone]    -],ADDRESS:[[address] [place] [address]]]

## 2021-11-11 NOTE — DISCHARGE NOTE PROVIDER - NSDCMRMEDTOKEN_GEN_ALL_CORE_FT
Adderall XR 5 mg oral capsule, extended release: 1 cap(s) orally once a day (in the morning), As Needed   cefixime 400 mg oral capsule: 1 cap(s) orally once a day until entire course is complete   Biktarvy oral tablet: 1 tab(s) orally once a day   cefixime 400 mg oral capsule: 1 cap(s) orally once a day until entire course is complete

## 2021-11-11 NOTE — DISCHARGE NOTE PROVIDER - HOSPITAL COURSE
27yo M with PMH HIV (off meds since July, unknown viral load) who presented to ED with 4d abdominal pain. Afebrile, HD stable, WBC wnl, CT with short segment jejunojejunal intussusception bowel wall thickening involving the rectosigmoid colon and ascending colon. Concerning for colitis with likely incidental intussusception. Stool studies + for shigella. GI was consulted and recommended treatment with ceftriaxone.  intussusception unlikely to be related to patient's current presentation. HIV team was consulted for assistance in management of his HIV. Throughout patients stay his pain and diarrhea improved and on day of discharge pt was stable for discharge home.    25yo M with PMH HIV (off meds since July, unknown viral load) who presented to ED with 4d abdominal pain. Afebrile, HD stable, WBC wnl, CT with short segment jejunojejunal intussusception bowel wall thickening involving the rectosigmoid colon and ascending colon. Concerning for colitis with likely incidental intussusception. Stool studies + for shigella. GI was consulted and recommended treatment with ceftriaxone.  intussusception unlikely to be related to patient's current presentation. HIV team was consulted for assistance in management of his HIV. Throughout patients stay his pain and diarrhea improved and on day of discharge pt was stable for discharge home with oral abx and follow up with GI outpatient.

## 2021-11-11 NOTE — CONSULT NOTE ADULT - ASSESSMENT
27yo M with PMH HIV (off meds since July, unknown viral load) who presents to ED with 4d abdominal pain. GI consulted for colitis, intussusception.     #Colitis  - personally reviewed CT imaging w/ thickening in rectosigmoid and ascending colon  - C diff negative, GI PCR positive for Shigella  - would treat with ceftriaxone, as fluoroquinolones, azithro, and bactrim have high rates of resistance in MSM and HIV+ patients  - would complete 5 days of treatment; following clinical improvement, can transition to PO cefixime or alternate oral cephalosporin   - intussusception does not appear to be related to patient's current presentation     Brandi Jones MD  PGY-5, Gastroenterology Fellow  pager: [phone]
26M w h/o HIV (off meds since 07/2021), prior h/o shigellosis p/w 4d abdominal pain, myalgia, fevers, diarrhea found to have colitis, GI PCR +EIEC    #Sepsis - 2/2 infectious diarrhea - EIEC. As evidenced by colitis on CT A/P. C Diff negative  #HIV - Has not been adherent w medications. Used to follow w Dr. Sanchez   - CD4 320  #Polysubstance use - does not appear in acute withdrawal at this time  #ADHD - pt reports being on adderall - No scripts found on [facility] [facility])  #Hypophosphatemia - 2.3 today. likely 2/2 decreased PO intake    Recommendations  -Agree w HIV consult; f/u VL  -F/U GI c/s - recommended 5d course of CTX  -Advance diet as tolerated per primary team - on CLD    DISPO: TBD - pending GI, HIV final recs, pt tolerating soft diet

## 2021-11-11 NOTE — CONSULT NOTE ADULT - SUBJECTIVE AND OBJECTIVE BOX
GASTROENTEROLOGY CONSULT NOTE  HPI:  27yo M with PMH HIV (off meds since July, unknown viral load) who presents to ED with 4d abdominal pain. Described pain as starting gradually in the lower abdomen 4 days ago, progressively worsened, 2 days ago associated with diarrhea, 1x BRBPR, now with improving pain and diarrhea. Endorses some nausea and body aches. Denies fevers, CP, SOB, emesis, changes in urinary habits. Never c-scope or EGD.     In ED, afebrile, HD stable, WBC wnl, CT with short segment of the jejunojejunal intussusception within the right hemiabdomen may represent transient intussusception. Bowel wall thickening involving the rectosigmoid colon and ascending colon, possible colitis. Mild to moderate right hydroureter without obstructive lesion. Underdistended urinary bladder limiting evaluation, possible bladder wall thickening.     PMH: ADHD, HIV (off meds since July, previously Biktarvy)   PSH: perianal abscess I&D (2021)  Meds: None  All: NKDA   SHX: current smoker, no ETOH, +marijuana, cocaine, meth, ketamine, undominciled stays with friends and family, sometimes sleeps/stays on street, unemployed and partially self-employed  FHX: no CRC, no IBD, DM    (10 Nov 2021 20:49)    GI consulted for colitis, intussusception. Patient seen and examined at bedside.     Allergies    No Known Drug Allergies  Seasonal (Sneezing)    Intolerances      Home Medications:  Adderall XR 5 mg oral capsule, extended release: 1 cap(s) orally once a day (in the morning), As Needed (13 May 2021 22:36)    MEDICATIONS:  MEDICATIONS  (STANDING):  cefTRIAXone   IVPB 2000 milliGRAM(s) IV Intermittent every 24 hours  heparin   Injectable 5000 Unit(s) SubCutaneous every 8 hours  lactated ringers. 1000 milliLiter(s) (100 mL/Hr) IV Continuous <Continuous>  magnesium sulfate  IVPB 2 Gram(s) IV Intermittent every 6 hours  potassium phosphate IVPB 15 milliMole(s) IV Intermittent once    MEDICATIONS  (PRN):  acetaminophen   IVPB .. 1000 milliGRAM(s) IV Intermittent once PRN Mild Pain (1 - 3), Moderate Pain (4 - 6)  ondansetron Injectable 4 milliGRAM(s) IV Push every 6 hours PRN Nausea    PAST MEDICAL & SURGICAL HISTORY:  HIV (human immunodeficiency virus infection)    Psoriasis    History of tonsillectomy  @ 7 years old      FAMILY HISTORY:  FH: breast cancer (Grandparent)    Family history of diabetes mellitus (DM) (Grandparent)    FH: heart disease (Grandparent)      SOCIAL HISTORY:  Tobacco: [ ] Current, [ ] Former, [ ] Never; Pack Years:  Alcohol:  Illicit Drugs:    REVIEW OF SYSTEMS:  CONSTITUTIONAL: No weakness, fevers or chills  HEENT: No visual changes; No vertigo or throat pain   NECK: No pain or stiffness  RESPIRATORY: No cough, wheezing, hemoptysis; No shortness of breath  CARDIOVASCULAR: No chest pain or palpitations  GASTROINTESTINAL: As above.  GENITOURINARY: No dysuria, frequency or hematuria  NEUROLOGICAL: No numbness or weakness  SKIN: No itching, burning, rashes, or lesions   All other 10 review of systems is negative unless indicated above.    Vital Signs Last 24 Hrs  T(C): 37.1 (11 Nov 2021 09:59), Max: 37.2 (10 Nov 2021 23:05)  T(F): 98.8 (11 Nov 2021 09:59), Max: 99 (10 Nov 2021 23:05)  HR: 80 (11 Nov 2021 09:59) (80 - 105)  BP: 110/68 (11 Nov 2021 09:59) (110/68 - 125/88)  BP(mean): --  RR: 16 (11 Nov 2021 09:59) (16 - 18)  SpO2: 100% (11 Nov 2021 09:59) (97% - 100%)      PHYSICAL EXAM:    General: in no acute distress  Eyes: Anicteric sclerae, moist conjunctivae  HENT: Moist mucous membranes  Neck: Trachea midline, supple  Lungs: Normal respiratory effort, no intercostal retractions  Cardiovascular: RRR  Abdomen: Soft, mildly tender in lower quadrants non-distended; No rebound or guarding  Extremities: Normal range of motion, No clubbing, cyanosis or edema  Neurological: Alert and oriented x3  Skin: Warm and dry. No obvious rash    LABS:                        11.3   3.88  )-----------( 247      ( 11 Nov 2021 06:00 )             34.7     11-11    138  |  101  |  6<L>  ----------------------------<  82  3.7   |  28  |  0.90    Ca    8.3<L>      11 Nov 2021 06:00  Phos  2.3     11-11  Mg     1.6     11-11    TPro  7.8  /  Alb  3.6  /  TBili  0.2  /  DBili  x   /  AST  22  /  ALT  53<H>  /  AlkPhos  82  11-10      Culture Results:   Shigella/Enteroinvasive E. coli (EIEC)  DETECTED by PCR  *******Please Note:*******  GI panel PCR evaluates for:  Campylobacter, Plesiomonas shigelloides, Salmonella,  Vibrio, Yersinia enterocolitica, Enteroaggregative  Escherichia coli (EAEC), Enteropathogenic E.coli (EPEC),  Enterotoxigenic E. coli (ETEC) lt/st, Shiga-like  toxin-producing E. coli (STEC) stx1/stx2,  Shigella/ Enteroinvasive E. coli (EIEC), Cryptosporidium,  Cyclospora cayetanensis, Entamoeba histolytica,  Giardia lamblia, Adenovirus F 40/41, Astrovirus,  Norovirus GI/GII, Rotavirus A, Sapovirus (11-11 @ 08:39)    PT/INR - ( 11 Nov 2021 06:00 )   PT: 13.1 sec;   INR: 1.10              GI PCR Panel, Stool (collected 11 Nov 2021 08:39)  Source: .Stool Feces  Final Report (11 Nov 2021 09:49):    Shigella/Enteroinvasive E. coli (EIEC)    DETECTED by PCR    *******Please Note:*******    GI panel PCR evaluates for:    Campylobacter, Plesiomonas shigelloides, Salmonella,    Vibrio, Yersinia enterocolitica, Enteroaggregative    Escherichia coli (EAEC), Enteropathogenic E.coli (EPEC),    Enterotoxigenic E. coli (ETEC) lt/st, Shiga-like    toxin-producing E. coli (STEC) stx1/stx2,    Shigella/ Enteroinvasive E. coli (EIEC), Cryptosporidium,    Cyclospora cayetanensis, Entamoeba histolytica,    Giardia lamblia, Adenovirus F 40/41, Astrovirus,    Norovirus GI/GII, Rotavirus A, Sapovirus      RADIOLOGY & ADDITIONAL STUDIES:     Reviewed

## 2021-11-11 NOTE — DISCHARGE NOTE PROVIDER - CARE PROVIDER_API CALL
[facility],   [address] [place] [address], [facility] [address]  Phone: [phone]  Fax: [phone]  Follow Up Time:    Titi Last J  GASTROENTEROLOGY  [address] [address]  Phone: [phone]  Fax: [phone]  Follow Up Time:     [facility],   [address], [place] [address]  Phone: [phone]  Fax: [phone]  Follow Up Time:     Jr Sanchez)  Internal Medicine  [address], [address]th [place] [address]  Phone: [phone]  Fax: [phone]  Follow Up Time:    Titi Last J  GASTROENTEROLOGY  [address] [address]  Phone: [phone]  Fax: [phone]  Follow Up Time:     Jr Sanchez)  Internal Medicine  [address], [address]th [place] [address]  Phone: [phone]  Fax: [phone]  Follow Up Time:     [facility] Clinic,   formerly [facility] [address]  [address] [address]  Phone: [phone]  Fax: (   )    -  Follow Up Time:

## 2021-11-11 NOTE — DISCHARGE NOTE PROVIDER - NSDCFUADDINST_GEN_ALL_CORE_FT
Please follow up with acute care surgery clinic as needed; you may call the office to make an appointment at your earliest convenience.    You have been prescribed oral antibiotics. Please be sure to complete the entire course as directed.    General Discharge Instructions:  Please resume all regular home medications unless specifically advised not to take a particular medication. Also, please take any new medications as prescribed.  Please get plenty of rest, continue to ambulate several times per day, and drink adequate amounts of fluids.   Please follow-up with your surgeon and Primary Care Provider (PCP) in 1-2 weeks.     Gastroenterology Follow Up:  Please follow up with Dr. Last in 2 weeks for a possible colonoscopy; you may call the office to make an appointment at your earliest convenience.  You have been prescribed oral antibiotics. Please be sure to complete the entire course as directed.    HIV Follow Up:  Please follow up with Dr. Sanchez in 1-2 weeks for further follow up on HIV managment; you may call the office to make an appointment at your earliest convenience.  Please continue to take your Biktarvy daily.       General Discharge Instructions:  Please resume all regular home medications unless specifically advised not to take a particular medication. Also, please take any new medications as prescribed.  Please get plenty of rest, continue to ambulate several times per day, and drink adequate amounts of fluids.   Please follow-up with your surgeon and Primary Care Provider (PCP) in 1-2 weeks.    Warning Signs:  Please call your doctor if you experience the following:  *You experience new chest pain, pressure, squeezing or tightness.  *New or worsening cough, shortness of breath, or wheeze.  *If you are vomiting and cannot keep down fluids or your medications.  *You are getting dehydrated due to continued vomiting, diarrhea, or other reasons. Signs of dehydration include dry mouth, rapid heartbeat, or feeling dizzy or faint when standing.  *You see blood or dark/black material when you vomit or have a bowel movement.  *You experience burning when you urinate, have blood in your urine, or experience a discharge.  *Your pain is not improving within 8-12 hours or is not gone within 24 hours. Call or return immediately if your pain is getting worse, changes location, or moves to your chest or back.  *You have shaking chills, or fever greater than 101.5 degrees Fahrenheit or 38 degrees Celsius.  *Any change in your symptoms, or any new symptoms that concern you.     Gastroenterology Follow Up:  Please follow up with Dr. Last in 2 weeks for a possible colonoscopy; you may call the office to make an appointment at your earliest convenience.  You have been prescribed oral antibiotics. Please be sure to complete the entire course as directed.    HIV Follow Up:  Please follow up with Dr. Sanchez in 1-2 weeks for further follow up on HIV managment; you may call the office to make an appointment at your earliest convenience.  Please continue to take your Biktarvy daily.     Urology Follow Up:  You were found to have right hydroureter and bladder wall thickening incidentally on CT scan. Please follow up with urology clinic in 1-2 weeks; you may call the office to make an appointment at your earliest convenience.      General Discharge Instructions:  Please resume all regular home medications unless specifically advised not to take a particular medication. Also, please take any new medications as prescribed.  Please get plenty of rest, continue to ambulate several times per day, and drink adequate amounts of fluids.   Please follow-up with your surgeon and Primary Care Provider (PCP) in 1-2 weeks.    Warning Signs:  Please call your doctor if you experience the following:  *You experience new chest pain, pressure, squeezing or tightness.  *New or worsening cough, shortness of breath, or wheeze.  *If you are vomiting and cannot keep down fluids or your medications.  *You are getting dehydrated due to continued vomiting, diarrhea, or other reasons. Signs of dehydration include dry mouth, rapid heartbeat, or feeling dizzy or faint when standing.  *You see blood or dark/black material when you vomit or have a bowel movement.  *You experience burning when you urinate, have blood in your urine, or experience a discharge.  *Your pain is not improving within 8-12 hours or is not gone within 24 hours. Call or return immediately if your pain is getting worse, changes location, or moves to your chest or back.  *You have shaking chills, or fever greater than 101.5 degrees Fahrenheit or 38 degrees Celsius.  *Any change in your symptoms, or any new symptoms that concern you.     Gastroenterology Follow Up:  Please follow up with Dr. Last in 2 weeks for a possible colonoscopy; you may call the office to make an appointment at your earliest convenience.  You have been prescribed oral antibiotics. Please be sure to complete the entire course as directed.    HIV Follow Up:  Please follow up with Dr. Sanchez in 1-2 weeks for further follow up on HIV managment; you may call the office to make an appointment at your earliest convenience.  Please continue to take your Biktarvy daily.     Urology Follow Up:  You were found to have right hydroureter and bladder wall thickening incidentally on CT scan. Please follow up with urology clinic located at [address] [place] [address], [address] in 2 weeks on any Friday; you may call the office to make an appointment at your earliest convenience at [phone].      General Discharge Instructions:  Please resume all regular home medications unless specifically advised not to take a particular medication. Also, please take any new medications as prescribed.  Please get plenty of rest, continue to ambulate several times per day, and drink adequate amounts of fluids.   Please follow-up with your surgeon and Primary Care Provider (PCP) in 1-2 weeks.    Warning Signs:  Please call your doctor if you experience the following:  *You experience new chest pain, pressure, squeezing or tightness.  *New or worsening cough, shortness of breath, or wheeze.  *If you are vomiting and cannot keep down fluids or your medications.  *You are getting dehydrated due to continued vomiting, diarrhea, or other reasons. Signs of dehydration include dry mouth, rapid heartbeat, or feeling dizzy or faint when standing.  *You see blood or dark/black material when you vomit or have a bowel movement.  *You experience burning when you urinate, have blood in your urine, or experience a discharge.  *Your pain is not improving within 8-12 hours or is not gone within 24 hours. Call or return immediately if your pain is getting worse, changes location, or moves to your chest or back.  *You have shaking chills, or fever greater than 101.5 degrees Fahrenheit or 38 degrees Celsius.  *Any change in your symptoms, or any new symptoms that concern you.

## 2021-11-11 NOTE — PROGRESS NOTE ADULT - ASSESSMENT
27yo M with PMH HIV (off meds since July, unknown viral load) who presents to ED with 4d abdominal pain. Afebrile, HD stable, WBC wnl, CT with short segment jejunojejunal intussusception bowel wall thickening involving the rectosigmoid colon and ascending colon. Concerning for colitis with likely incidental intussusception. Stool studies + for shigella    CLD/IVF   Pain/nausea control PRN  Ceftriaxone 2g   Serial abdominal exams   OOBA/SCDs/SQH/IS   AM Labs  f/u GI recs  HIV team consulted 25yo M with PMH HIV (off meds since July, unknown viral load) who presents to ED with 4d abdominal pain. Afebrile, HD stable, WBC wnl, CT with short segment jejunojejunal intussusception bowel wall thickening involving the rectosigmoid colon and ascending colon. Concerning for colitis with likely incidental intussusception. Stool studies + for shigella    CLD/IVF   Pain/nausea control PRN  Biktarvy  Ceftriaxone 2g x 5d (11/10-11/15)  Serial abdominal exams   OOBA/SCDs/SQH/IS   AM Labs  f/u GI recs  f/u Syphilis screen   HIV team consulted

## 2021-11-11 NOTE — CONSULT NOTE ADULT - SUBJECTIVE AND OBJECTIVE BOX
HPI "25yo M with PMH HIV (off meds since July, unknown viral load) who presents to ED with 4d abdominal pain. Described pain as starting gradually in the lower abdomen 4 days ago, progressively worsened, 2 days ago associated with diarrhea, 1x BRBPR, now with improving pain and diarrhea. Endorses some nausea and body aches. Denies fevers, CP, SOB, emesis, changes in urinary habits. Never c-scope or EGD.     In ED, afebrile, HD stable, WBC wnl, CT with short segment of the jejunojejunal intussusception within the right hemiabdomen may represent transient intussusception. Bowel wall thickening involving the rectosigmoid colon and ascending colon, possible colitis. Mild to moderate right hydroureter without obstructive lesion. Underdistended urinary bladder limiting evaluation, possible bladder wall thickening.     26M w h/o HIV (off meds since 07/2021), prior h/o shigellosis p/w 4d abdominal pain, myalgia, fevers, diarrhea found to have colitis, GI PCR +EIEC    Pt reports 4d of watery diarrhea accompanied by nausea wo emesis and abdominal discomfort. Also reports fever, chills, myalgias in 1st 2 days. Denies sick contacts. Reports it felt similar to prior episode of shigellosis (not admitted at that time). Denies any melena, hematochezia, dysuria, cough, dyspnea, new rashes. During visit pt reports feeling hungry and wanting something to eat/drink.     ROS: 12 point reviewed and otherwise negative    PMH: ADHD on adderall, HIV (dx [id number]) - used to see Dr. Sanchez. Does not remember last CD4  PSH: Perianal abscess I&D  Medications - Adderall  Allergies: NKDA  FH: No CRC  SH: Works in [employer] and other [employer]. Undomiciled and stays w friends and family  Smokes tobacco intermittently. Social EtOH. last used meth and ketamine 2-3wk ago. Reports IVDU but in a risk reduction program  +MSM - multiple partners, variable protection. Reports receptive and penetrative anal sex.       PAST MEDICAL & SURGICAL HISTORY:  HIV (human immunodeficiency virus infection)    Psoriasis    History of tonsillectomy  @ 7 years old      Home Meds: Home Medications:  Adderall XR 5 mg oral capsule, extended release: 1 cap(s) orally once a day (in the morning), As Needed (13 May 2021 22:36)    Allergies: Allergies    No Known Drug Allergies  Seasonal (Sneezing)    Intolerances      Soc:   Advanced Directives: Presumed Full Code     CURRENT MEDICATIONS:   --------------------------------------------------------------------------------------  Neurologic Medications  acetaminophen   IVPB .. 1000 milliGRAM(s) IV Intermittent once PRN Mild Pain (1 - 3), Moderate Pain (4 - 6)  ondansetron Injectable 4 milliGRAM(s) IV Push every 6 hours PRN Nausea    Respiratory Medications    Cardiovascular Medications    Gastrointestinal Medications  lactated ringers. 1000 milliLiter(s) IV Continuous <Continuous>    Genitourinary Medications    Hematologic/Oncologic Medications  heparin   Injectable 5000 Unit(s) SubCutaneous every 8 hours  influenza   Vaccine 0.5 milliLiter(s) IntraMuscular once    Antimicrobial/Immunologic Medications  bictegravir 50 mG/emtricitabine 200 mG/tenofovir alafenamide 25 mG (BIKTARVY) 1 Tablet(s) Oral daily  cefTRIAXone   IVPB 2000 milliGRAM(s) IV Intermittent every 24 hours    Endocrine/Metabolic Medications    Topical/Other Medications    --------------------------------------------------------------------------------------    VITAL SIGNS, INS/OUTS (last 24 hours):  --------------------------------------------------------------------------------------  ICU Vital Signs Last 24 Hrs  T(C): 37.2 (11 Nov 2021 12:44), Max: 37.3 (11 Nov 2021 10:47)  T(F): 99 (11 Nov 2021 12:44), Max: 99.2 (11 Nov 2021 10:47)  HR: 82 (11 Nov 2021 12:44) (80 - 105)  BP: 110/71 (11 Nov 2021 12:44) (105/69 - 125/88)  BP(mean): --  ABP: --  ABP(mean): --  RR: 17 (11 Nov 2021 12:44) (16 - 18)  SpO2: 100% (11 Nov 2021 12:44) (97% - 100%)    I&O's Summary    11 Nov 2021 07:01  -  11 Nov 2021 15:37  --------------------------------------------------------  IN: 1050 mL / OUT: 570 mL / NET: 480 mL      --------------------------------------------------------------------------------------    EXAM:  GEN: Male in NAD on RA  HEENT: NC/AT, MMM  CV: RRR, nml S1S2, no murmurs  PULM: nml effort, CTAB wo rales, rhonchi, or wheezing  ABD: Soft, non-distended, NABS, non-tender  NEURO  A/O x3, moving all extremities, Sensation intact  PSYCH: Appropriate    LABS  --------------------------------------------------------------------------------------  Labs:  CAPILLARY BLOOD GLUCOSE                              11.3   3.88  )-----------( 247      ( 11 Nov 2021 06:00 )             34.7       Auto Neutrophil %: 50.0 % (11-10-21 @ 17:31)    11-11    138  |  101  |  6<L>  ----------------------------<  82  3.7   |  28  |  0.90      eGFR if Non African American: 117 mL/min/1.73M2 (11-11-21 @ 06:00)      LFTs:             7.8  | 0.2  | 22       ------------------[82      ( 10 Nov 2021 17:27 )  3.6  | x    | 53          Lipase:20     Amylase:x             Coags:     13.1   ----< 1.10    ( 11 Nov 2021 06:00 )     x                   Urinalysis Basic - ( 10 Nov 2021 19:30 )    Color: Yellow / Appearance: Clear / SG: <=1.005 / pH: x  Gluc: x / Ketone: NEGATIVE  / Bili: Negative / Urobili: 0.2 E.U./dL   Blood: x / Protein: NEGATIVE mg/dL / Nitrite: NEGATIVE   Leuk Esterase: NEGATIVE / RBC: x / WBC x   Sq Epi: x / Non Sq Epi: x / Bacteria: x        GI PCR Panel, Stool (collected 11 Nov 2021 08:39)  Source: .Stool Feces  Final Report (11 Nov 2021 09:49):    Shigella/Enteroinvasive E. coli (EIEC)    DETECTED by PCR    *******Please Note:*******    GI panel PCR evaluates for:    Campylobacter, Plesiomonas shigelloides, Salmonella,    Vibrio, Yersinia enterocolitica, Enteroaggregative    Escherichia coli (EAEC), Enteropathogenic E.coli (EPEC),    Enterotoxigenic E. coli (ETEC) lt/st, Shiga-like    toxin-producing E. coli (STEC) stx1/stx2,    Shigella/ Enteroinvasive E. coli (EIEC), Cryptosporidium,    Cyclospora cayetanensis, Entamoeba histolytica,    Giardia lamblia, Adenovirus F 40/41, Astrovirus,    Norovirus GI/GII, Rotavirus A, Sapovirus    Culture - Urine (collected 10 Nov 2021 22:15)  Source: Clean Catch Clean Catch (Midstream)  Final Report (11 Nov 2021 14:30):    No growth        --------------------------------------------------------------------------------------    OTHER LABS    IMAGING RESULTS  ****************

## 2021-11-11 NOTE — DISCHARGE NOTE PROVIDER - CARE PROVIDERS DIRECT ADDRESSES
,DirectAddress_Unknown [email or web address],DirectAddress_Unknown,DirectAddress_Unknown

## 2021-11-11 NOTE — DISCHARGE NOTE PROVIDER - NSDCCPCAREPLAN_GEN_ALL_CORE_FT
Sent medication request in   PRINCIPAL DISCHARGE DIAGNOSIS  Diagnosis: Colitis  Assessment and Plan of Treatment:

## 2021-11-11 NOTE — CONSULT NOTE ADULT - ATTENDING COMMENTS
Pt seen with fellow today.  Treat shigella with cephalosporin.  Outpt f/u -- will determine if a colonoscopy is warranted.

## 2021-11-12 ENCOUNTER — NON-APPOINTMENT (OUTPATIENT)
Age: 26
End: 2021-11-12

## 2021-11-12 ENCOUNTER — TRANSCRIPTION ENCOUNTER (OUTPATIENT)
Age: 26
End: 2021-11-12

## 2021-11-12 VITALS
SYSTOLIC BLOOD PRESSURE: 119 MMHG | TEMPERATURE: 99 F | HEART RATE: 77 BPM | RESPIRATION RATE: 17 BRPM | OXYGEN SATURATION: 99 % | DIASTOLIC BLOOD PRESSURE: 72 MMHG

## 2021-11-12 LAB
C TRACH RRNA SPEC QL NAA+PROBE: DETECTED
C TRACH+GC RRNA ANAL QL PROBE: SIGNIFICANT CHANGE UP
CHLAMYDIA/N. GONORRHEA, ANAL/RECTAL, TMA - SOURCE ANAL: SIGNIFICANT CHANGE UP
CMV IGG FLD QL: >10 U/ML — HIGH
CMV IGG SERPL-IMP: POSITIVE
CMV IGM FLD-ACNC: <8 AU/ML — SIGNIFICANT CHANGE UP
CMV IGM SERPL QL: NEGATIVE — SIGNIFICANT CHANGE UP
HIV-1 VIRAL LOAD RESULT: ABNORMAL
HIV1 RNA # SERPL NAA+PROBE: SIGNIFICANT CHANGE UP
HIV1 RNA SER-IMP: SIGNIFICANT CHANGE UP
HIV1 RNA SERPL NAA+PROBE-ACNC: ABNORMAL
HIV1 RNA SERPL NAA+PROBE-LOG#: 5.64 — SIGNIFICANT CHANGE UP
N GONORRHOEA RRNA SPEC QL NAA+PROBE: SIGNIFICANT CHANGE UP
RPR SER-TITR: (no result)
RPR SERPL-ACNC: REACTIVE
T PALLIDUM AB TITR SER: POSITIVE

## 2021-11-12 PROCEDURE — 87177 OVA AND PARASITES SMEARS: CPT

## 2021-11-12 PROCEDURE — 36415 COLL VENOUS BLD VENIPUNCTURE: CPT

## 2021-11-12 PROCEDURE — 87086 URINE CULTURE/COLONY COUNT: CPT

## 2021-11-12 PROCEDURE — 87591 N.GONORRHOEAE DNA AMP PROB: CPT

## 2021-11-12 PROCEDURE — 87491 CHLMYD TRACH DNA AMP PROBE: CPT

## 2021-11-12 PROCEDURE — 86645 CMV ANTIBODY IGM: CPT

## 2021-11-12 PROCEDURE — 87181 SC STD AGAR DILUTION PER AGT: CPT

## 2021-11-12 PROCEDURE — 86780 TREPONEMA PALLIDUM: CPT

## 2021-11-12 PROCEDURE — 99232 SBSQ HOSP IP/OBS MODERATE 35: CPT | Mod: GC

## 2021-11-12 PROCEDURE — 87324 CLOSTRIDIUM AG IA: CPT

## 2021-11-12 PROCEDURE — 87186 SC STD MICRODIL/AGAR DIL: CPT

## 2021-11-12 PROCEDURE — 80048 BASIC METABOLIC PNL TOTAL CA: CPT

## 2021-11-12 PROCEDURE — 86593 SYPHILIS TEST NON-TREP QUANT: CPT

## 2021-11-12 PROCEDURE — 86359 T CELLS TOTAL COUNT: CPT

## 2021-11-12 PROCEDURE — 87507 IADNA-DNA/RNA PROBE TQ 12-25: CPT

## 2021-11-12 PROCEDURE — 81003 URINALYSIS AUTO W/O SCOPE: CPT

## 2021-11-12 PROCEDURE — 80053 COMPREHEN METABOLIC PANEL: CPT

## 2021-11-12 PROCEDURE — 99231 SBSQ HOSP IP/OBS SF/LOW 25: CPT | Mod: GC

## 2021-11-12 PROCEDURE — 87449 NOS EACH ORGANISM AG IA: CPT

## 2021-11-12 PROCEDURE — 74177 CT ABD & PELVIS W/CONTRAST: CPT | Mod: MC

## 2021-11-12 PROCEDURE — 99285 EMERGENCY DEPT VISIT HI MDM: CPT

## 2021-11-12 PROCEDURE — 87045 FECES CULTURE AEROBIC BACT: CPT

## 2021-11-12 PROCEDURE — 83735 ASSAY OF MAGNESIUM: CPT

## 2021-11-12 PROCEDURE — 83690 ASSAY OF LIPASE: CPT

## 2021-11-12 PROCEDURE — 86592 SYPHILIS TEST NON-TREP QUAL: CPT

## 2021-11-12 PROCEDURE — 85025 COMPLETE CBC W/AUTO DIFF WBC: CPT

## 2021-11-12 PROCEDURE — 87046 STOOL CULTR AEROBIC BACT EA: CPT

## 2021-11-12 PROCEDURE — 86769 SARS-COV-2 COVID-19 ANTIBODY: CPT

## 2021-11-12 PROCEDURE — 85610 PROTHROMBIN TIME: CPT

## 2021-11-12 PROCEDURE — 84100 ASSAY OF PHOSPHORUS: CPT

## 2021-11-12 PROCEDURE — 86360 T CELL ABSOLUTE COUNT/RATIO: CPT

## 2021-11-12 PROCEDURE — 85027 COMPLETE CBC AUTOMATED: CPT

## 2021-11-12 PROCEDURE — 86644 CMV ANTIBODY: CPT

## 2021-11-12 PROCEDURE — 87536 HIV-1 QUANT&REVRSE TRNSCRPJ: CPT

## 2021-11-12 PROCEDURE — 87635 SARS-COV-2 COVID-19 AMP PRB: CPT

## 2021-11-12 RX ORDER — DEXTROAMPHETAMINE SACCHARATE, AMPHETAMINE ASPARTATE, DEXTROAMPHETAMINE SULFATE AND AMPHETAMINE SULFATE 1.875; 1.875; 1.875; 1.875 MG/1; MG/1; MG/1; MG/1
1 TABLET ORAL
Qty: 0 | Refills: 0 | DISCHARGE

## 2021-11-12 RX ADMIN — HEPARIN SODIUM 5000 UNIT(S): 5000 INJECTION INTRAVENOUS; SUBCUTANEOUS at 06:59

## 2021-11-12 RX ADMIN — BICTEGRAVIR SODIUM, EMTRICITABINE, AND TENOFOVIR ALAFENAMIDE FUMARATE 1 TABLET(S): 30; 120; 15 TABLET ORAL at 11:20

## 2021-11-12 RX ADMIN — CEFTRIAXONE 100 MILLIGRAM(S): 500 INJECTION, POWDER, FOR SOLUTION INTRAMUSCULAR; INTRAVENOUS at 11:20

## 2021-11-12 NOTE — PROGRESS NOTE ADULT - REASON FOR ADMISSION
colitis, intussusception

## 2021-11-12 NOTE — CHART NOTE - NSCHARTNOTEFT_GEN_A_CORE
HIV Consult Note    CC:  Patient is a 26y old Male w/ h/o HIV who presents with a chief complaint of abdominal pain and diarrhea. Found to have colitis, intussusception, GI PCR + shigella s/p IV CTX.    Brief HPI:  25yo M with PMH HIV (off meds since July, Most recent CD4 224 w/ %CD4 of 20, unknown viral load) who presented to ED with 4d abdominal pain. Afebrile, HD stable, WBC wnl, CT with short segment jejunojejunal intussusception bowel wall thickening involving the rectosigmoid colon and ascending colon. Concerning for colitis with likely incidental intussusception. Stool studies + for shigella. GI was consulted and recommended treatment with ceftriaxone.  Intussusception unlikely to be related to patient's current presentation. HIV team was consulted for assistance in management of his HIV and he was restarted on his Biktarvy. Throughout patients stay his pain and diarrhea improved and on day of discharge pt was stable for discharge home with oral abx and follow up with GI outpatient. Patient was seen by the HIV team on 11/12 prior to discharge and he was scheduled for outpatient follow up with Dr. Oreilly the following week.    12 pt ROS otherwise negative. Patient symptomatically improved.     PAST MEDICAL & SURGICAL HISTORY:  - HIV (human immunodeficiency virus infection)  - Psoriasis  - History of tonsillectomy @ 7 years old    STI Hx: Unknown     FHx: Non-contributory    HIV History:   Outpatient HIV Provider: Dr. Sanchez   Year of HIV Diagnosis: 2016  T cell alix: Unknown  Highest Viral Load: 335,000 in May   Current ARV regimen: Biktarvy  ARV adherence: Stopped taking Biktarvy in July after running out of refills and missing several appointments.   Previous ARV regimens: Unknown   Hx of Past Opportunistic Infections: Unknown     Social/Sexual Hx: Unknown     MEDICATIONS  (STANDING):  bictegravir 50 mG/emtricitabine 200 mG/tenofovir alafenamide 25 mG (BIKTARVY) 1 Tablet(s) Oral daily  cefTRIAXone   IVPB 2000 milliGRAM(s) IV Intermittent every 24 hours  heparin   Injectable 5000 Unit(s) SubCutaneous every 8 hours  influenza   Vaccine 0.5 milliLiter(s) IntraMuscular once    MEDICATIONS  (PRN):  acetaminophen   IVPB .. 1000 milliGRAM(s) IV Intermittent once PRN Mild Pain (1 - 3), Moderate Pain (4 - 6)  ondansetron Injectable 4 milliGRAM(s) IV Push every 6 hours PRN Nausea    Allergies    No Known Drug Allergies  Seasonal (Sneezing)    Intolerances    PHYSICAL EXAM  General: A&Ox3; NAD  Head: NC/AT; MMM; EOMI, no JVD  Respiratory: Non distressed breathing   Extremities: WWP; no edema/cyanosis  Neurological: no obvious focal deficits  Skin: No skin lesions appreciated     LABS:                         11.3   3.88  )-----------( 247      ( 11 Nov 2021 06:00 )             34.7     11-11    138  |  101  |  6<L>  ----------------------------<  82  3.7   |  28  |  0.90    Ca    8.3<L>      11 Nov 2021 06:00  Phos  2.3     11-11  Mg     1.6     11-11    TPro  7.8  /  Alb  3.6  /  TBili  0.2  /  DBili  x   /  AST  22  /  ALT  53<H>  /  AlkPhos  82  11-10    PT/INR - ( 11 Nov 2021 06:00 )   PT: 13.1 sec;   INR: 1.10       Urinalysis Basic - ( 10 Nov 2021 19:30 )    Color: Yellow / Appearance: Clear / SG: <=1.005 / pH: x  Gluc: x / Ketone: NEGATIVE  / Bili: Negative / Urobili: 0.2 E.U./dL   Blood: x / Protein: NEGATIVE mg/dL / Nitrite: NEGATIVE   Leuk Esterase: NEGATIVE / RBC: x / WBC x   Sq Epi: x / Non Sq Epi: x / Bacteria: x    Microbiology/Cultures: Reviewed    Images/EKG/etc: Reviewed      Assessment/Recommendation:  Patient seen and examined at bedside with Dr. Morris and recommendations were discussed     Patient is a 26y old Male w/ h/o HIV (CD4 on this admission 224 w/ %CD4 20, unknown viral load) previously on Biktarvy (stopped in July) who presents with a chief complaint of abdominal pain and diarrhea. Found to have colitis, intussusception, GI PCR + shigella s/p IV CTX.    - CW Biktarvy, please ensure patient has a month supply of this medication at discharge as he doesn't have any at home  - We scheduled outpatient follow up with Dr. Sanchez at [facility] on Wednesday, 11/17 @ 3 PM. Please make sure patient is aware.     Provided HIV infection and treatment education to patient  Counseled patient on risks/benefits of treatment and non-adherence    HIV will see patient in clinic HIV Consult Note    CC:  Patient is a 26y old Male w/ h/o HIV who presents with a chief complaint of abdominal pain and diarrhea. Found to have colitis, intussusception, GI PCR + shigella s/p IV CTX.    Brief HPI:  25yo M with PMH HIV (off meds since July, Most recent CD4 224 w/ %CD4 of 20, unknown viral load) who presented to ED with 4d abdominal pain. Afebrile, HD stable, WBC wnl, CT with short segment jejunojejunal intussusception bowel wall thickening involving the rectosigmoid colon and ascending colon. Concerning for colitis with likely incidental intussusception. Stool studies + for shigella. GI was consulted and recommended treatment with ceftriaxone.  Intussusception unlikely to be related to patient's current presentation. HIV team was consulted for assistance in management of his HIV and he was restarted on his Biktarvy. Throughout patients stay his pain and diarrhea improved and on day of discharge pt was stable for discharge home with oral abx and follow up with GI outpatient. Patient was seen by the HIV team on 11/12 prior to discharge and he was scheduled for outpatient follow up with Dr. Oreilly the following week.    12 pt ROS otherwise negative. Patient symptomatically improved.     PAST MEDICAL & SURGICAL HISTORY:  - HIV (human immunodeficiency virus infection)  - Psoriasis  - History of tonsillectomy @ 7 years old    STI Hx: Unknown     FHx: Non-contributory    HIV History:   Outpatient HIV Provider: Dr. Sanchez   Year of HIV Diagnosis: 2016  T cell alix: Unknown  Highest Viral Load: 335,000 in May   Current ARV regimen: Biktarvy  ARV adherence: Stopped taking Biktarvy in July after running out of refills and missing several appointments.   Previous ARV regimens: Unknown   Hx of Past Opportunistic Infections: Unknown     Social/Sexual Hx: Unknown     MEDICATIONS  (STANDING):  bictegravir 50 mG/emtricitabine 200 mG/tenofovir alafenamide 25 mG (BIKTARVY) 1 Tablet(s) Oral daily  cefTRIAXone   IVPB 2000 milliGRAM(s) IV Intermittent every 24 hours  heparin   Injectable 5000 Unit(s) SubCutaneous every 8 hours  influenza   Vaccine 0.5 milliLiter(s) IntraMuscular once    MEDICATIONS  (PRN):  acetaminophen   IVPB .. 1000 milliGRAM(s) IV Intermittent once PRN Mild Pain (1 - 3), Moderate Pain (4 - 6)  ondansetron Injectable 4 milliGRAM(s) IV Push every 6 hours PRN Nausea    Allergies    No Known Drug Allergies  Seasonal (Sneezing)    Intolerances    PHYSICAL EXAM  General: A&Ox3; NAD  Head: NC/AT; MMM; EOMI, no JVD  Respiratory: Non distressed breathing   Extremities: WWP; no edema/cyanosis  Neurological: no obvious focal deficits  Skin: No skin lesions appreciated     LABS:                         11.3   3.88  )-----------( 247      ( 11 Nov 2021 06:00 )             34.7     11-11    138  |  101  |  6<L>  ----------------------------<  82  3.7   |  28  |  0.90    Ca    8.3<L>      11 Nov 2021 06:00  Phos  2.3     11-11  Mg     1.6     11-11    TPro  7.8  /  Alb  3.6  /  TBili  0.2  /  DBili  x   /  AST  22  /  ALT  53<H>  /  AlkPhos  82  11-10    PT/INR - ( 11 Nov 2021 06:00 )   PT: 13.1 sec;   INR: 1.10       Urinalysis Basic - ( 10 Nov 2021 19:30 )    Color: Yellow / Appearance: Clear / SG: <=1.005 / pH: x  Gluc: x / Ketone: NEGATIVE  / Bili: Negative / Urobili: 0.2 E.U./dL   Blood: x / Protein: NEGATIVE mg/dL / Nitrite: NEGATIVE   Leuk Esterase: NEGATIVE / RBC: x / WBC x   Sq Epi: x / Non Sq Epi: x / Bacteria: x    Microbiology/Cultures: Reviewed    Images/EKG/etc: Reviewed      Assessment/Recommendation:  Patient seen and examined at bedside with Dr. Morris and recommendations were discussed     Patient is a 26y old Male w/ h/o HIV (CD4 on this admission 224 w/ %CD4 20, unknown viral load) previously on Biktarvy (stopped in July) who presents with a chief complaint of abdominal pain and diarrhea. Found to have colitis, intussusception, GI PCR + shigella s/p IV CTX.    - CW Biktarvy, please ensure patient has a month supply of this medication at discharge as he doesn't have any at home  - We scheduled outpatient follow up with Dr. Sanchez at [facility] on Wednesday, 11/17 @ 3 PM. Please make sure patient is aware.     Provided HIV infection and treatment education to patient  Counseled patient on risks/benefits of treatment and non-adherence    HIV will see patient in clinic    I saw and evaluated the patient at bedside  Reviewed laba and notes  Agree with above  FU at [facility]

## 2021-11-12 NOTE — DISCHARGE NOTE NURSING/CASE MANAGEMENT/SOCIAL WORK - PATIENT PORTAL LINK FT
You can access the FollowMyHealth Patient Portal offered by [facility] by registering at the following website: [email or web address]. By joining [employer]’s FollowMyHealth portal, you will also be able to view your health information using other applications (apps) compatible with our system.

## 2021-11-12 NOTE — PROGRESS NOTE ADULT - SUBJECTIVE AND OBJECTIVE BOX
GASTROENTEROLOGY PROGRESS NOTE  Patient seen and examined at bedside. No further diarrhea. Tolerating diet w/o pain, n/v.     PERTINENT REVIEW OF SYSTEMS:  CONSTITUTIONAL: No weakness, fevers or chills  HEENT: No visual changes; No vertigo or throat pain   GASTROINTESTINAL: As above.  NEUROLOGICAL: No numbness or weakness  SKIN: No itching, burning, rashes, or lesions     Allergies    No Known Drug Allergies  Seasonal (Sneezing)    Intolerances      MEDICATIONS:  MEDICATIONS  (STANDING):  bictegravir 50 mG/emtricitabine 200 mG/tenofovir alafenamide 25 mG (BIKTARVY) 1 Tablet(s) Oral daily  cefTRIAXone   IVPB 2000 milliGRAM(s) IV Intermittent every 24 hours  heparin   Injectable 5000 Unit(s) SubCutaneous every 8 hours  influenza   Vaccine 0.5 milliLiter(s) IntraMuscular once    MEDICATIONS  (PRN):  acetaminophen   IVPB .. 1000 milliGRAM(s) IV Intermittent once PRN Mild Pain (1 - 3), Moderate Pain (4 - 6)  ondansetron Injectable 4 milliGRAM(s) IV Push every 6 hours PRN Nausea    Vital Signs Last 24 Hrs  T(C): 36.7 (12 Nov 2021 04:44), Max: 37.3 (11 Nov 2021 10:47)  T(F): 98.1 (12 Nov 2021 04:44), Max: 99.2 (11 Nov 2021 10:47)  HR: 74 (12 Nov 2021 04:44) (74 - 92)  BP: 108/71 (12 Nov 2021 04:44) (105/69 - 122/76)  BP(mean): --  RR: 18 (12 Nov 2021 04:44) (17 - 18)  SpO2: 98% (12 Nov 2021 04:44) (95% - 100%)    11-11 @ 07:01  -  11-12 @ 07:00  --------------------------------------------------------  IN: 1500 mL / OUT: 1470 mL / NET: 30 mL      PHYSICAL EXAM:    General: in no acute distress  HEENT: MMM, conjunctiva and sclera clear  Gastrointestinal: Soft non-tender non-distended; No rebound or guarding  Skin: Warm and dry. No obvious rash    LABS:                        11.3   3.88  )-----------( 247      ( 11 Nov 2021 06:00 )             34.7     11-11    138  |  101  |  6<L>  ----------------------------<  82  3.7   |  28  |  0.90    Ca    8.3<L>      11 Nov 2021 06:00  Phos  2.3     11-11  Mg     1.6     11-11    TPro  7.8  /  Alb  3.6  /  TBili  0.2  /  DBili  x   /  AST  22  /  ALT  53<H>  /  AlkPhos  82  11-10    PT/INR - ( 11 Nov 2021 06:00 )   PT: 13.1 sec;   INR: 1.10                Urinalysis Basic - ( 10 Nov 2021 19:30 )    Color: Yellow / Appearance: Clear / SG: <=1.005 / pH: x  Gluc: x / Ketone: NEGATIVE  / Bili: Negative / Urobili: 0.2 E.U./dL   Blood: x / Protein: NEGATIVE mg/dL / Nitrite: NEGATIVE   Leuk Esterase: NEGATIVE / RBC: x / WBC x   Sq Epi: x / Non Sq Epi: x / Bacteria: x                GI PCR Panel, Stool (collected 11 Nov 2021 08:39)  Source: .Stool Feces  Final Report (11 Nov 2021 09:49):    Shigella/Enteroinvasive E. coli (EIEC)    DETECTED by PCR    *******Please Note:*******    GI panel PCR evaluates for:    Campylobacter, Plesiomonas shigelloides, Salmonella,    Vibrio, Yersinia enterocolitica, Enteroaggregative    Escherichia coli (EAEC), Enteropathogenic E.coli (EPEC),    Enterotoxigenic E. coli (ETEC) lt/st, Shiga-like    toxin-producing E. coli (STEC) stx1/stx2,    Shigella/ Enteroinvasive E. coli (EIEC), Cryptosporidium,    Cyclospora cayetanensis, Entamoeba histolytica,    Giardia lamblia, Adenovirus F 40/41, Astrovirus,    Norovirus GI/GII, Rotavirus A, Sapovirus    Culture - Urine (collected 10 Nov 2021 22:15)  Source: Clean Catch Clean Catch (Midstream)  Final Report (11 Nov 2021 14:30):    No growth      RADIOLOGY & ADDITIONAL STUDIES:  Reviewed
  SUBJECTIVE: Pt seen and examined bedside. Pt is doing well, resting comfortably on bed. Reporting that pain is improving. Had 2-3 episodes of diarrhea since admission. No nausea or vomiting. No complaints at this time.    Vital Signs Last 24 Hrs  T(C): 37.1 (11 Nov 2021 09:59), Max: 37.2 (10 Nov 2021 23:05)  T(F): 98.8 (11 Nov 2021 09:59), Max: 99 (10 Nov 2021 23:05)  HR: 80 (11 Nov 2021 09:59) (80 - 105)  BP: 110/68 (11 Nov 2021 09:59) (110/68 - 125/88)  RR: 16 (11 Nov 2021 09:59) (16 - 18)  SpO2: 100% (11 Nov 2021 09:59) (97% - 100%)    I&O's Summary      Physical Exam:  General Appearance: Appears well, NAD  Pulmonary: Nonlabored breathing, no respiratory distress  Cardiovascular: NSR  Abdomen: Soft,  tender along the lower abdomen, nondistended. no rebound or guarding.   Extremities: WWP, SCD's in place     LABS:                        11.3   3.88  )-----------( 247      ( 11 Nov 2021 06:00 )             34.7     11-11    138  |  101  |  6<L>  ----------------------------<  82  3.7   |  28  |  0.90    Ca    8.3<L>      11 Nov 2021 06:00  Phos  2.3     11-11  Mg     1.6     11-11    TPro  7.8  /  Alb  3.6  /  TBili  0.2  /  DBili  x   /  AST  22  /  ALT  53<H>  /  AlkPhos  82  11-10    PT/INR - ( 11 Nov 2021 06:00 )   PT: 13.1 sec;   INR: 1.10            Urinalysis Basic - ( 10 Nov 2021 19:30 )    Color: Yellow / Appearance: Clear / SG: <=1.005 / pH: x  Gluc: x / Ketone: NEGATIVE  / Bili: Negative / Urobili: 0.2 E.U./dL   Blood: x / Protein: NEGATIVE mg/dL / Nitrite: NEGATIVE   Leuk Esterase: NEGATIVE / RBC: x / WBC x   Sq Epi: x / Non Sq Epi: x / Bacteria: x      
SERIAL ABDOMINAL EXAM    SUBJECTIVE: Pt denies any pain, nausea, vomiting. Has not had any BMs or passed gas       Physical Exam  General: NAD, resting comfortably in bed  Pulmonary: Nonlabored breathing, no respiratory distress  CV: NSR  Abd: soft, ND, mildly tender to palpation in the RLQ and suprapubic region, no guarding,  Extremities: (-) edema, warm, well-perfused      MEDICATIONS  (STANDING):  cefTRIAXone   IVPB 1000 milliGRAM(s) IV Intermittent every 24 hours  heparin   Injectable 5000 Unit(s) SubCutaneous every 8 hours  lactated ringers. 1000 milliLiter(s) (100 mL/Hr) IV Continuous <Continuous>  metroNIDAZOLE  IVPB 500 milliGRAM(s) IV Intermittent every 8 hours    MEDICATIONS  (PRN):  acetaminophen   IVPB .. 1000 milliGRAM(s) IV Intermittent once PRN Mild Pain (1 - 3), Moderate Pain (4 - 6)  ondansetron Injectable 4 milliGRAM(s) IV Push every 6 hours PRN Nausea      Vital Signs Last 24 Hrs  T(C): 37.2 (10 Nov 2021 23:05), Max: 37.2 (10 Nov 2021 23:05)  T(F): 99 (10 Nov 2021 23:05), Max: 99 (10 Nov 2021 23:05)  HR: 105 (10 Nov 2021 23:05) (92 - 105)  BP: 121/72 (10 Nov 2021 23:05) (121/72 - 125/88)  BP(mean): --  RR: 18 (10 Nov 2021 23:05) (16 - 18)  SpO2: 97% (10 Nov 2021 23:05) (97% - 99%)          I&O's Detail      LABS:                        13.0   4.86  )-----------( 287      ( 10 Nov 2021 17:31 )             40.1     11-10    136  |  99  |  10  ----------------------------<  85  3.6   |  26  |  0.71    Ca    8.6      10 Nov 2021 17:27    TPro  7.8  /  Alb  3.6  /  TBili  0.2  /  DBili  x   /  AST  22  /  ALT  53<H>  /  AlkPhos  82  11-10      Urinalysis Basic - ( 10 Nov 2021 19:30 )    Color: Yellow / Appearance: Clear / SG: <=1.005 / pH: x  Gluc: x / Ketone: NEGATIVE  / Bili: Negative / Urobili: 0.2 E.U./dL   Blood: x / Protein: NEGATIVE mg/dL / Nitrite: NEGATIVE   Leuk Esterase: NEGATIVE / RBC: x / WBC x   Sq Epi: x / Non Sq Epi: x / Bacteria: x      Plan:  - Will continue to assess with serial abdominal exams
Patient is a 26y old  Male who presents with a chief complaint of colitis, intussusception (12 Nov 2021 10:28)    INTERVAL EVENTS:  - no nausea, no dysuria,   - last bowel movement last night- semi-formed stool   - interval improvement in abdominal discomfort     SUBJECTIVE:  Patient was seen and examined at bedside.  Review of systems: Patient denies: fever, chills, dizziness, HA, Changes in vision, CP, dyspnea, nausea or vomiting, dysuria, LE edema. Rest of 12 point Review of systems negative unless otherwise documented elsewhere in note.     MEDICATIONS:  MEDICATIONS  (STANDING):  bictegravir 50 mG/emtricitabine 200 mG/tenofovir alafenamide 25 mG (BIKTARVY) 1 Tablet(s) Oral daily  cefTRIAXone   IVPB 2000 milliGRAM(s) IV Intermittent every 24 hours  heparin   Injectable 5000 Unit(s) SubCutaneous every 8 hours    MEDICATIONS  (PRN):  acetaminophen   IVPB .. 1000 milliGRAM(s) IV Intermittent once PRN Mild Pain (1 - 3), Moderate Pain (4 - 6)  ondansetron Injectable 4 milliGRAM(s) IV Push every 6 hours PRN Nausea      Allergies    No Known Drug Allergies  Seasonal (Sneezing)    Intolerances        OBJECTIVE:  Vital Signs Last 24 Hrs  T(C): 37.1 (12 Nov 2021 12:40), Max: 37.2 (12 Nov 2021 10:30)  T(F): 98.7 (12 Nov 2021 12:40), Max: 98.9 (12 Nov 2021 10:30)  HR: 77 (12 Nov 2021 12:40) (74 - 92)  BP: 119/72 (12 Nov 2021 12:40) (107/63 - 122/76)  BP(mean): --  RR: 17 (12 Nov 2021 12:40) (16 - 18)  SpO2: 99% (12 Nov 2021 12:40) (95% - 99%)  I&O's Summary    11 Nov 2021 07:01  -  12 Nov 2021 07:00  --------------------------------------------------------  IN: 1500 mL / OUT: 1470 mL / NET: 30 mL    12 Nov 2021 07:01  -  12 Nov 2021 14:44  --------------------------------------------------------  IN: 690 mL / OUT: 1050 mL / NET: -360 mL        PHYSICAL EXAM:  Gen: Reclining in bed at time of exam, appears stated age  HEENT: NCAT, MMM, clear OP  Neck: supple, trachea at midline  CV: RRR, +S1/S2  Pulm: adequate respiratory effort, no increase in work of breathing  Abd: soft, ND; minimal tenderness to palpation   Skin: warm and dry, no new rashes vs prior report  Ext: WWP, no LE edema  Neuro: AOx3, no gross focal neurological deficits  Psych: affect and behavior appropriate, pleasant at time of interview    LABS:                        11.3   3.88  )-----------( 247      ( 11 Nov 2021 06:00 )             34.7     11-11    138  |  101  |  6<L>  ----------------------------<  82  3.7   |  28  |  0.90    Ca    8.3<L>      11 Nov 2021 06:00  Phos  2.3     11-11  Mg     1.6     11-11    TPro  7.8  /  Alb  3.6  /  TBili  0.2  /  DBili  x   /  AST  22  /  ALT  53<H>  /  AlkPhos  82  11-10    LIVER FUNCTIONS - ( 10 Nov 2021 17:27 )  Alb: 3.6 g/dL / Pro: 7.8 g/dL / ALK PHOS: 82 U/L / ALT: 53 U/L / AST: 22 U/L / GGT: x           PT/INR - ( 11 Nov 2021 06:00 )   PT: 13.1 sec;   INR: 1.10            CAPILLARY BLOOD GLUCOSE        Urinalysis Basic - ( 10 Nov 2021 19:30 )    Color: Yellow / Appearance: Clear / SG: <=1.005 / pH: x  Gluc: x / Ketone: NEGATIVE  / Bili: Negative / Urobili: 0.2 E.U./dL   Blood: x / Protein: NEGATIVE mg/dL / Nitrite: NEGATIVE   Leuk Esterase: NEGATIVE / RBC: x / WBC x   Sq Epi: x / Non Sq Epi: x / Bacteria: x        MICRODATA:    Culture - Stool (collected 11 Nov 2021 08:39)  Source: .Stool Feces  Preliminary Report (12 Nov 2021 12:49):    Culture in progress    GI PCR Panel, Stool (collected 11 Nov 2021 08:39)  Source: .Stool Feces  Final Report (11 Nov 2021 09:49):    Shigella/Enteroinvasive E. coli (EIEC)    DETECTED by PCR    *******Please Note:*******    GI panel PCR evaluates for:    Campylobacter, Plesiomonas shigelloides, Salmonella,    Vibrio, Yersinia enterocolitica, Enteroaggregative    Escherichia coli (EAEC), Enteropathogenic E.coli (EPEC),    Enterotoxigenic E. coli (ETEC) lt/st, Shiga-like    toxin-producing E. coli (STEC) stx1/stx2,    Shigella/ Enteroinvasive E. coli (EIEC), Cryptosporidium,    Cyclospora cayetanensis, Entamoeba histolytica,    Giardia lamblia, Adenovirus F 40/41, Astrovirus,    Norovirus GI/GII, Rotavirus A, Sapovirus    Culture - Urine (collected 10 Nov 2021 22:15)  Source: Clean Catch Clean Catch (Midstream)  Final Report (11 Nov 2021 14:30):    No growth        RADIOLOGY/OTHER STUDIES:  
SUBJECTIVE: Pt seen and examined by chief resident. Pt is doing well, resting comfortably on bed. Denies abdominal pain, nausea and vomiting. Diarrhea improving. Tolerating regular diet.     Vital Signs Last 24 Hrs  T(C): 36.7 (12 Nov 2021 04:44), Max: 37.3 (11 Nov 2021 10:47)  T(F): 98.1 (12 Nov 2021 04:44), Max: 99.2 (11 Nov 2021 10:47)  HR: 74 (12 Nov 2021 04:44) (74 - 92)  BP: 108/71 (12 Nov 2021 04:44) (105/69 - 122/76)  BP(mean): --  RR: 18 (12 Nov 2021 04:44) (16 - 18)  SpO2: 98% (12 Nov 2021 04:44) (95% - 100%)    I&O's Summary    11 Nov 2021 07:01  -  12 Nov 2021 06:51  --------------------------------------------------------  IN: 1500 mL / OUT: 1470 mL / NET: 30 mL        Physical Exam:  General Appearance: Appears well, NAD  Pulmonary: Nonlabored breathing, no respiratory distress  Abdomen: Soft, nondisteded, nontender, no rebound or guarding  Extremities: WWP, SCD's in place     LABS:                        11.3   3.88  )-----------( 247      ( 11 Nov 2021 06:00 )             34.7     11-11    138  |  101  |  6<L>  ----------------------------<  82  3.7   |  28  |  0.90    Ca    8.3<L>      11 Nov 2021 06:00  Phos  2.3     11-11  Mg     1.6     11-11    TPro  7.8  /  Alb  3.6  /  TBili  0.2  /  DBili  x   /  AST  22  /  ALT  53<H>  /  AlkPhos  82  11-10    PT/INR - ( 11 Nov 2021 06:00 )   PT: 13.1 sec;   INR: 1.10            Urinalysis Basic - ( 10 Nov 2021 19:30 )    Color: Yellow / Appearance: Clear / SG: <=1.005 / pH: x  Gluc: x / Ketone: NEGATIVE  / Bili: Negative / Urobili: 0.2 E.U./dL   Blood: x / Protein: NEGATIVE mg/dL / Nitrite: NEGATIVE   Leuk Esterase: NEGATIVE / RBC: x / WBC x   Sq Epi: x / Non Sq Epi: x / Bacteria: x

## 2021-11-12 NOTE — PROGRESS NOTE ADULT - ASSESSMENT
25yo M with PMH HIV (off meds since July, unknown viral load) who presents to ED with 4d abdominal pain. Afebrile, HD stable, WBC wnl, CT with short segment jejunojejunal intussusception bowel wall thickening involving the rectosigmoid colon and ascending colon. Concerning for colitis with likely incidental intussusception. Stool studies + for shigella    Reg diet  Pain/nausea control PRN  Biktarvy  Ceftriaxone 2g x 5d (11/10-11/15)  Serial abdominal exams   OOBA/SCDs/SQH/IS   AM Labs  f/u GI recs  f/u Syphilis  HIV team consulted

## 2021-11-12 NOTE — PROGRESS NOTE ADULT - ASSESSMENT
26M w h/o HIV (off meds since 07/2021), prior h/o shigellosis p/w 4d abdominal pain, myalgia, fevers, diarrhea found to have colitis, GI PCR +EIEC    # Sepsis (present on admission)  Within 12 hours of admission to [facility], met at least 2/4 SIRS criteria.  WBCs 3.88 HR 92 bpm,   Source:  2/2  EIEC infectious diarrhea; Improved with treatment of Enteroinvasive E. Coli with antibiotics.     # infectious diarrhea 2/2 EIEC  Continue 3rd gen cephalosporin per GI recs     #HIV - Has not been adherent w medications. Used to follow w Dr. Duncan  - CD4 320 ;  - continue biktarvy   - Counseled patient on following up with Dr. Duncan after discharge     #Polysubstance use - does not appear in acute withdrawal at this time    #ADHD - pt reports being on adderall - No scripts found on [facility]STOP- ([place] or [facility])    #Hypophosphatemia -  likely 2/2 decreased PO intake; encourage po intake . replete

## 2021-11-12 NOTE — PROGRESS NOTE ADULT - ASSESSMENT
25yo M with PMH HIV (off meds since July, unknown viral load) who presents to ED with 4d abdominal pain. GI consulted for colitis, intussusception.     #Colitis  - personally reviewed CT imaging w/ thickening in rectosigmoid and ascending colon  - C diff negative, GI PCR positive for Shigella  - would treat with ceftriaxone, as fluoroquinolones, azithro, and bactrim have high rates of resistance in MSM and HIV+ patients  - would complete 5 days of treatment; following clinical improvement, can transition to PO cefixime or alternate oral cephalosporin   - intussusception does not appear to be related to patient's current presentation   - will arrange for outpatient f/u; office will call patient to schedule     Thank you for allowing us to participate in the care of this patient.  GI will sign off. Please call back with any questions or concerns.     Brandi Jones MD  PGY-5, Gastroenterology Fellow  pager: [phone]

## 2021-11-13 LAB
-  AMPICILLIN: SIGNIFICANT CHANGE UP
-  AMPICILLIN: SIGNIFICANT CHANGE UP
-  CIPROFLOXACIN: >2 — SIGNIFICANT CHANGE UP
-  CIPROFLOXACIN: SIGNIFICANT CHANGE UP
-  TRIMETHOPRIM/SULFAMETHOXAZOLE: SIGNIFICANT CHANGE UP
-  TRIMETHOPRIM/SULFAMETHOXAZOLE: SIGNIFICANT CHANGE UP
METHOD TYPE: SIGNIFICANT CHANGE UP

## 2021-11-13 NOTE — CHART NOTE - NSCHARTNOTEFT_GEN_A_CORE
Notified by lab about shigella growing in stool culture.  Teams involved in care were aware based on documentation and abx tailored to ID related data.

## 2021-11-14 LAB
-  AMPICILLIN: SIGNIFICANT CHANGE UP
-  AMPICILLIN: SIGNIFICANT CHANGE UP
-  CIPROFLOXACIN: SIGNIFICANT CHANGE UP
-  TRIMETHOPRIM/SULFAMETHOXAZOLE: SIGNIFICANT CHANGE UP
METHOD TYPE: SIGNIFICANT CHANGE UP

## 2021-11-15 ENCOUNTER — TRANSCRIPTION ENCOUNTER (OUTPATIENT)
Age: 26
End: 2021-11-15

## 2021-11-15 RX ORDER — DOXYCYCLINE 100 MG/1
100 TABLET, FILM COATED ORAL
Qty: 42 | Refills: 0 | Status: ACTIVE | COMMUNITY
Start: 2021-05-26 | End: 1900-01-01

## 2021-11-16 LAB
-  CEFTRIAXONE: SIGNIFICANT CHANGE UP
CULTURE RESULTS: SIGNIFICANT CHANGE UP
SPECIMEN SOURCE: SIGNIFICANT CHANGE UP

## 2021-11-17 ENCOUNTER — NON-APPOINTMENT (OUTPATIENT)
Age: 26
End: 2021-11-17

## 2021-11-17 ENCOUNTER — APPOINTMENT (OUTPATIENT)
Dept: INFECTIOUS DISEASE | Facility: CLINIC | Age: 26
End: 2021-11-17

## 2021-11-18 ENCOUNTER — NON-APPOINTMENT (OUTPATIENT)
Age: 26
End: 2021-11-18

## 2021-11-19 DIAGNOSIS — A41.9 SEPSIS, UNSPECIFIED ORGANISM: ICD-10-CM

## 2021-11-19 DIAGNOSIS — Z91.14 PATIENT'S OTHER NONCOMPLIANCE WITH MEDICATION REGIMEN: ICD-10-CM

## 2021-11-19 DIAGNOSIS — F19.10 OTHER PSYCHOACTIVE SUBSTANCE ABUSE, UNCOMPLICATED: ICD-10-CM

## 2021-11-19 DIAGNOSIS — F14.10 COCAINE ABUSE, UNCOMPLICATED: ICD-10-CM

## 2021-11-19 DIAGNOSIS — F90.9 ATTENTION-DEFICIT HYPERACTIVITY DISORDER, UNSPECIFIED TYPE: ICD-10-CM

## 2021-11-19 DIAGNOSIS — F15.10 OTHER STIMULANT ABUSE, UNCOMPLICATED: ICD-10-CM

## 2021-11-19 DIAGNOSIS — K56.1 INTUSSUSCEPTION: ICD-10-CM

## 2021-11-19 DIAGNOSIS — Z72.0 TOBACCO USE: ICD-10-CM

## 2021-11-19 DIAGNOSIS — L40.9 PSORIASIS, UNSPECIFIED: ICD-10-CM

## 2021-11-19 DIAGNOSIS — A03.9 SHIGELLOSIS, UNSPECIFIED: ICD-10-CM

## 2021-11-19 DIAGNOSIS — A04.2 ENTEROINVASIVE ESCHERICHIA COLI INFECTION: ICD-10-CM

## 2021-11-19 DIAGNOSIS — Z59.01 SHELTERED HOMELESSNESS: ICD-10-CM

## 2021-11-19 DIAGNOSIS — B20 HUMAN IMMUNODEFICIENCY VIRUS [HIV] DISEASE: ICD-10-CM

## 2021-11-19 DIAGNOSIS — Z56.0 UNEMPLOYMENT, UNSPECIFIED: ICD-10-CM

## 2021-11-19 DIAGNOSIS — E83.39 OTHER DISORDERS OF PHOSPHORUS METABOLISM: ICD-10-CM

## 2021-11-19 DIAGNOSIS — F12.10 CANNABIS ABUSE, UNCOMPLICATED: ICD-10-CM

## 2021-11-19 LAB
CULTURE RESULTS: SIGNIFICANT CHANGE UP
ORGANISM # SPEC MICROSCOPIC CNT: SIGNIFICANT CHANGE UP
SPECIMEN SOURCE: SIGNIFICANT CHANGE UP

## 2021-11-19 SDOH — ECONOMIC STABILITY - INCOME SECURITY: UNEMPLOYMENT, UNSPECIFIED: Z56.0

## 2021-11-19 SDOH — ECONOMIC STABILITY - HOUSING INSECURITY: SHELTERED HOMELESSNESS: Z59.01

## 2021-11-27 ENCOUNTER — EMERGENCY (EMERGENCY)
Facility: HOSPITAL | Age: 26
LOS: 1 days | Discharge: HOME | End: 2021-11-27
Attending: EMERGENCY MEDICINE | Admitting: EMERGENCY MEDICINE
Payer: MEDICAID

## 2021-11-27 VITALS
SYSTOLIC BLOOD PRESSURE: 113 MMHG | DIASTOLIC BLOOD PRESSURE: 73 MMHG | OXYGEN SATURATION: 99 % | HEART RATE: 93 BPM | TEMPERATURE: 99 F | RESPIRATION RATE: 18 BRPM

## 2021-11-27 VITALS
HEIGHT: 69 IN | RESPIRATION RATE: 20 BRPM | DIASTOLIC BLOOD PRESSURE: 81 MMHG | HEART RATE: 126 BPM | WEIGHT: 134.92 LBS | SYSTOLIC BLOOD PRESSURE: 149 MMHG | TEMPERATURE: 99 F

## 2021-11-27 DIAGNOSIS — R10.9 UNSPECIFIED ABDOMINAL PAIN: ICD-10-CM

## 2021-11-27 DIAGNOSIS — J98.11 ATELECTASIS: ICD-10-CM

## 2021-11-27 DIAGNOSIS — L40.9 PSORIASIS, UNSPECIFIED: ICD-10-CM

## 2021-11-27 DIAGNOSIS — R07.89 OTHER CHEST PAIN: ICD-10-CM

## 2021-11-27 DIAGNOSIS — Z91.048 OTHER NONMEDICINAL SUBSTANCE ALLERGY STATUS: ICD-10-CM

## 2021-11-27 DIAGNOSIS — Z90.89 ACQUIRED ABSENCE OF OTHER ORGANS: Chronic | ICD-10-CM

## 2021-11-27 DIAGNOSIS — Z21 ASYMPTOMATIC HUMAN IMMUNODEFICIENCY VIRUS [HIV] INFECTION STATUS: ICD-10-CM

## 2021-11-27 DIAGNOSIS — E86.0 DEHYDRATION: ICD-10-CM

## 2021-11-27 LAB
ALBUMIN SERPL ELPH-MCNC: 4.9 G/DL — SIGNIFICANT CHANGE UP (ref 3.3–5)
ALP SERPL-CCNC: 118 U/L — SIGNIFICANT CHANGE UP (ref 40–120)
ALT FLD-CCNC: 58 U/L — HIGH (ref 10–45)
ANION GAP SERPL CALC-SCNC: 14 MMOL/L — SIGNIFICANT CHANGE UP (ref 5–17)
ANISOCYTOSIS BLD QL: SLIGHT — SIGNIFICANT CHANGE UP
APPEARANCE UR: CLEAR — SIGNIFICANT CHANGE UP
AST SERPL-CCNC: 41 U/L — HIGH (ref 10–40)
BASOPHILS # BLD AUTO: 0.07 K/UL — SIGNIFICANT CHANGE UP (ref 0–0.2)
BASOPHILS NFR BLD AUTO: 1 % — SIGNIFICANT CHANGE UP (ref 0–2)
BILIRUB SERPL-MCNC: 0.3 MG/DL — SIGNIFICANT CHANGE UP (ref 0.2–1.2)
BILIRUB UR-MCNC: NEGATIVE — SIGNIFICANT CHANGE UP
BUN SERPL-MCNC: 7 MG/DL — SIGNIFICANT CHANGE UP (ref 7–23)
CALCIUM SERPL-MCNC: 9.3 MG/DL — SIGNIFICANT CHANGE UP (ref 8.4–10.5)
CHLORIDE SERPL-SCNC: 95 MMOL/L — LOW (ref 96–108)
CO2 SERPL-SCNC: 27 MMOL/L — SIGNIFICANT CHANGE UP (ref 22–31)
COLOR SPEC: YELLOW — SIGNIFICANT CHANGE UP
CREAT SERPL-MCNC: 0.75 MG/DL — SIGNIFICANT CHANGE UP (ref 0.5–1.3)
D DIMER BLD IA.RAPID-MCNC: 467 NG/ML DDU — HIGH
DIFF PNL FLD: NEGATIVE — SIGNIFICANT CHANGE UP
EOSINOPHIL # BLD AUTO: 0 K/UL — SIGNIFICANT CHANGE UP (ref 0–0.5)
EOSINOPHIL NFR BLD AUTO: 0 % — SIGNIFICANT CHANGE UP (ref 0–6)
GIANT PLATELETS BLD QL SMEAR: PRESENT — SIGNIFICANT CHANGE UP
GLUCOSE SERPL-MCNC: 81 MG/DL — SIGNIFICANT CHANGE UP (ref 70–99)
GLUCOSE UR QL: NEGATIVE — SIGNIFICANT CHANGE UP
HCT VFR BLD CALC: 42.5 % — SIGNIFICANT CHANGE UP (ref 39–50)
HGB BLD-MCNC: 13.2 G/DL — SIGNIFICANT CHANGE UP (ref 13–17)
KETONES UR-MCNC: NEGATIVE — SIGNIFICANT CHANGE UP
LACTATE SERPL-SCNC: 1.8 MMOL/L — SIGNIFICANT CHANGE UP (ref 0.5–2)
LACTATE SERPL-SCNC: 3 MMOL/L — HIGH (ref 0.5–2)
LEUKOCYTE ESTERASE UR-ACNC: NEGATIVE — SIGNIFICANT CHANGE UP
LIDOCAIN IGE QN: 25 U/L — SIGNIFICANT CHANGE UP (ref 7–60)
LYMPHOCYTES # BLD AUTO: 1.49 K/UL — SIGNIFICANT CHANGE UP (ref 1–3.3)
LYMPHOCYTES # BLD AUTO: 21 % — SIGNIFICANT CHANGE UP (ref 13–44)
MANUAL SMEAR VERIFICATION: SIGNIFICANT CHANGE UP
MCHC RBC-ENTMCNC: 27.4 PG — SIGNIFICANT CHANGE UP (ref 27–34)
MCHC RBC-ENTMCNC: 31.1 GM/DL — LOW (ref 32–36)
MCV RBC AUTO: 88.2 FL — SIGNIFICANT CHANGE UP (ref 80–100)
MONOCYTES # BLD AUTO: 0.78 K/UL — SIGNIFICANT CHANGE UP (ref 0–0.9)
MONOCYTES NFR BLD AUTO: 11 % — SIGNIFICANT CHANGE UP (ref 2–14)
NEUTROPHILS # BLD AUTO: 4.48 K/UL — SIGNIFICANT CHANGE UP (ref 1.8–7.4)
NEUTROPHILS NFR BLD AUTO: 61 % — SIGNIFICANT CHANGE UP (ref 43–77)
NEUTS BAND # BLD: 2 % — SIGNIFICANT CHANGE UP (ref 0–8)
NITRITE UR-MCNC: NEGATIVE — SIGNIFICANT CHANGE UP
PH UR: 6.5 — SIGNIFICANT CHANGE UP (ref 5–8)
PLAT MORPH BLD: ABNORMAL
PLATELET # BLD AUTO: 422 K/UL — HIGH (ref 150–400)
POLYCHROMASIA BLD QL SMEAR: SLIGHT — SIGNIFICANT CHANGE UP
POTASSIUM SERPL-MCNC: 4 MMOL/L — SIGNIFICANT CHANGE UP (ref 3.5–5.3)
POTASSIUM SERPL-SCNC: 4 MMOL/L — SIGNIFICANT CHANGE UP (ref 3.5–5.3)
PROT SERPL-MCNC: 9.1 G/DL — HIGH (ref 6–8.3)
PROT UR-MCNC: NEGATIVE MG/DL — SIGNIFICANT CHANGE UP
RBC # BLD: 4.82 M/UL — SIGNIFICANT CHANGE UP (ref 4.2–5.8)
RBC # FLD: 12.7 % — SIGNIFICANT CHANGE UP (ref 10.3–14.5)
RBC BLD AUTO: ABNORMAL
SARS-COV-2 RNA SPEC QL NAA+PROBE: NEGATIVE — SIGNIFICANT CHANGE UP
SMUDGE CELLS # BLD: PRESENT — SIGNIFICANT CHANGE UP
SODIUM SERPL-SCNC: 136 MMOL/L — SIGNIFICANT CHANGE UP (ref 135–145)
SP GR SPEC: 1.01 — SIGNIFICANT CHANGE UP (ref 1–1.03)
UROBILINOGEN FLD QL: 0.2 E.U./DL — SIGNIFICANT CHANGE UP
VARIANT LYMPHS # BLD: 4 % — SIGNIFICANT CHANGE UP (ref 0–6)
WBC # BLD: 7.11 K/UL — SIGNIFICANT CHANGE UP (ref 3.8–10.5)
WBC # FLD AUTO: 7.11 K/UL — SIGNIFICANT CHANGE UP (ref 3.8–10.5)

## 2021-11-27 PROCEDURE — 99285 EMERGENCY DEPT VISIT HI MDM: CPT

## 2021-11-27 PROCEDURE — 84484 ASSAY OF TROPONIN QUANT: CPT

## 2021-11-27 PROCEDURE — 80053 COMPREHEN METABOLIC PANEL: CPT

## 2021-11-27 PROCEDURE — 71275 CT ANGIOGRAPHY CHEST: CPT | Mod: 26,MC

## 2021-11-27 PROCEDURE — 71045 X-RAY EXAM CHEST 1 VIEW: CPT | Mod: 26

## 2021-11-27 PROCEDURE — 85379 FIBRIN DEGRADATION QUANT: CPT

## 2021-11-27 PROCEDURE — 96374 THER/PROPH/DIAG INJ IV PUSH: CPT | Mod: XU

## 2021-11-27 PROCEDURE — 87040 BLOOD CULTURE FOR BACTERIA: CPT

## 2021-11-27 PROCEDURE — 87086 URINE CULTURE/COLONY COUNT: CPT

## 2021-11-27 PROCEDURE — 99284 EMERGENCY DEPT VISIT MOD MDM: CPT | Mod: 25

## 2021-11-27 PROCEDURE — 36415 COLL VENOUS BLD VENIPUNCTURE: CPT

## 2021-11-27 PROCEDURE — 71275 CT ANGIOGRAPHY CHEST: CPT | Mod: MC

## 2021-11-27 PROCEDURE — 85025 COMPLETE CBC W/AUTO DIFF WBC: CPT

## 2021-11-27 PROCEDURE — 83605 ASSAY OF LACTIC ACID: CPT

## 2021-11-27 PROCEDURE — 81003 URINALYSIS AUTO W/O SCOPE: CPT

## 2021-11-27 PROCEDURE — 83690 ASSAY OF LIPASE: CPT

## 2021-11-27 PROCEDURE — 87635 SARS-COV-2 COVID-19 AMP PRB: CPT

## 2021-11-27 PROCEDURE — 71045 X-RAY EXAM CHEST 1 VIEW: CPT

## 2021-11-27 PROCEDURE — 93005 ELECTROCARDIOGRAM TRACING: CPT

## 2021-11-27 RX ORDER — SODIUM CHLORIDE 9 MG/ML
1000 INJECTION INTRAMUSCULAR; INTRAVENOUS; SUBCUTANEOUS ONCE
Refills: 0 | Status: COMPLETED | OUTPATIENT
Start: 2021-11-27 | End: 2021-11-27

## 2021-11-27 RX ORDER — MORPHINE SULFATE 50 MG/1
4 CAPSULE, EXTENDED RELEASE ORAL ONCE
Refills: 0 | Status: DISCONTINUED | OUTPATIENT
Start: 2021-11-27 | End: 2021-11-27

## 2021-11-27 RX ORDER — KETOROLAC TROMETHAMINE 30 MG/ML
30 SYRINGE (ML) INJECTION ONCE
Refills: 0 | Status: DISCONTINUED | OUTPATIENT
Start: 2021-11-27 | End: 2021-11-27

## 2021-11-27 RX ADMIN — Medication 30 MILLIGRAM(S): at 18:57

## 2021-11-27 RX ADMIN — SODIUM CHLORIDE 1000 MILLILITER(S): 9 INJECTION INTRAMUSCULAR; INTRAVENOUS; SUBCUTANEOUS at 18:57

## 2021-11-27 NOTE — ED ADULT NURSE NOTE - CHIEF COMPLAINT QUOTE
Patient c/o of generalized muscle pain, abdominal pain 8/10 w/ SOB and increased sharp pain when taking a deep breath, with sweating and chills, symptoms started yesterday w/ subjective fevers.  Was seen and admitted 2 weeks ago in this hospital, diagnosed w/ shigella then, had diarrhea, nausea, no vomitting , w/  headache.  At this time denies any nausea/vomitting or diarrhea.  Patient very uncomfortable in ED triage due to pain w/ elevated ;  pain upgrade to Dr. Villalpando.

## 2021-11-27 NOTE — ED PROVIDER NOTE - NSFOLLOWUPINSTRUCTIONS_ED_ALL_ED_FT
Chest Pain    Chest pain can be caused by many different conditions which may or may not be dangerous. Causes include heartburn, lung infections, heart attack, blood clot in lungs, skin infections, strain or damage to muscle, cartilage, or bones, etc. In addition to a history and physical examination, an electrocardiogram (ECG) or other lab tests may have been performed to determine the cause of your chest pain. Follow up with your primary care provider or with a cardiologist as instructed.     SEEK IMMEDIATE MEDICAL CARE IF YOU HAVE ANY OF THE FOLLOWING SYMPTOMS: worsening chest pain, coughing up blood, unexplained back/neck/jaw pain, severe abdominal pain, dizziness or lightheadedness, fainting, shortness of breath, sweaty or clammy skin, vomiting, or racing heart beat. These symptoms may represent a serious problem that is an emergency. Do not wait to see if the symptoms will go away. Get medical help right away. Call 911 and do not drive yourself to the hospital.        Dehydration    WHAT YOU NEED TO KNOW:    Dehydration is a condition that develops when your body does not have enough fluid. You may become dehydrated if you do not drink enough water or lose too much fluid. Fluid loss may also cause loss of electrolytes (minerals), such as sodium.    DISCHARGE INSTRUCTIONS:    Seek care immediately if:   •You have a seizure.      •You are confused or cannot think clearly.      •You are extremely sleepy, or another person cannot wake you.       •You become dizzy or faint when you stand.      •You are not able to urinate.      •You have trouble breathing.      •You have a fast or irregular heartbeat.      •Your hands or feet are cold, or your face is pale.       Contact your healthcare provider if:   •You have trouble drinking liquids because you are vomiting.      •Your symptoms get worse.      •You have a fever.       •You feel very weak or tired.      •You have questions or concerns about your condition or care.      Follow up with your healthcare provider as directed: Write down your questions so you remember to ask them during your visits.     Prevent or manage dehydration:   •Drink liquids as directed. Liquids that contain water, sugar, and minerals can help your body hold in fluid and help prevent dehydration. Drink liquids throughout the day, not just when you feel thirsty. Men should drink about 3 liters (13 eight-ounce cups) of liquid each day. Women should drink about 2 liters (9 eight-ounce cups) of liquid each day. Drink even more liquid if you will be outdoors, in the sun for a long time, or exercising.       •Stay cool. Limit the time you spend outdoors during the hottest part of the day. Dress in lightweight clothes.       •Keep track of how often you urinate. If you urinate less than usual or your urine is darker, drink more liquids.         © Copyright [employer] 2021

## 2021-11-27 NOTE — ED ADULT TRIAGE NOTE - CHIEF COMPLAINT QUOTE
Patient c/o of generlized muscle pain, abdominal pain w/ SOB and increased sharp pain when taking a deep breath, with sweating and chills, symptoms started yesterday w/ subjective fevers.  Was seen and admitted 2 weeks ago in this hospital, diagnosed w/ shigella then, had diarrhea, nausea, no vomitting , w/  headache.  At this time denies any nausea/vomitting or diarrhea. Patient c/o of generalized muscle pain, abdominal pain 8/10 w/ SOB and increased sharp pain when taking a deep breath, with sweating and chills, symptoms started yesterday w/ subjective fevers.  Was seen and admitted 2 weeks ago in this hospital, diagnosed w/ shigella then, had diarrhea, nausea, no vomitting , w/  headache.  At this time denies any nausea/vomitting or diarrhea.  Patient very uncomfortable in ED triage due to pain w/ elevated ;  pain upgrade to Dr. Villalpando.

## 2021-11-27 NOTE — ED PROVIDER NOTE - CARE PLAN
[age]F pmh breast ca, s/p recent admission where found to have bilat PEs, on coumadin, s/p ppm at that time as well, sent in from medicine clinic for fatigue, sob, continued L sided chest pain. of note pt had persistently elevated wbc during visit but was d/c'd due to lack of infectious sx. +nonproductive cough. denies n/v/d. denies headahce, dizziness, palpitations, or other complaints.    PE: NAD, NCAT, MMM, Trachea midline, Normal conjunctiva, lungs CTAB, S1/S2 RRR, Normal perfusion, 2+ radial pulses bilat, Abdomen Soft, NTND, No rebound/guarding, No LE edema, No deformity of extremities, No rashes,  No focal motor or sensory deficits. No calf swellign or ttp.    Pt afebrile in ED. Plan to obtain labs, cxr, urinalysis, cultures. does have cp and sob but with recent cta, is anticoagulated, to defer further imaging to admitting team at request of dr gould. ekg without ischemic changes, to send trop. pt to be admitted for further workup including infectious workup. - Clay Felipe MD Principal Discharge DX:	Chest pain  Secondary Diagnosis:	Dehydration

## 2021-11-27 NOTE — ED PROVIDER NOTE - NSPTACCESSSVCSAPPTDETAILS_ED_ALL_ED_FT
Pulmonary follow up in 1-2 days for possible pulmonary infarct vs atelectasis. Pt with chest pain, SOB.

## 2021-11-27 NOTE — ED PROVIDER NOTE - PHYSICAL EXAMINATION
VITAL SIGNS: I have reviewed nursing notes and confirm.  CONSTITUTIONAL: Well appearing, in no acute distress.   SKIN:  warm and dry, no acute rash.   HEAD:  normocephalic, atraumatic.  EYES: EOM intact; conjunctiva and sclera clear.  ENT: No nasal discharge; airway clear.   NECK: Supple; non tender.  CARD: S1, S2 normal; no murmurs, gallops, or rubs. Regular rate and rhythm. Anterior chest wall tenderness to palpation.   RESP:  Clear to auscultation b/l, no wheezes, rales or rhonchi.  ABD: Normal bowel sounds; soft; non-distended; no guarding/ rebound. Minimal abdominal tenderness to palpation.  EXT: Normal ROM. No clubbing, cyanosis or edema. 2+ pulses to b/l ue/le.  NEURO: Alert, oriented, grossly unremarkable  PSYCH: Cooperative, mood and affect appropriate.

## 2021-11-27 NOTE — ED PROVIDER NOTE - PATIENT PORTAL LINK FT
You can access the FollowMyHealth Patient Portal offered by [facility] by registering at the following website: [email or web address]. By joining [email or web address] portal, you will also be able to view your health information using other applications (apps) compatible with our system.

## 2021-11-27 NOTE — ED PROVIDER NOTE - OBJECTIVE STATEMENT
25 y/o M with significant history of HIV on Biktarvy presents to ED with body aches and low energy since he was discharged on Nov 12th and pain with breathing that began yesterday. Pt states he has had low energy since being discharged and stayed in bed all day yesterday because of it. In ED, pt is speaking full sentences and is not hypoxic. Pt was seen immediately secondary to critical condition. Pt is in moderate distress secondary to pain.

## 2021-11-27 NOTE — ED PROVIDER NOTE - PROGRESS NOTE DETAILS
Called by Dr. Velazco with discrepency read on CTA. Pt with possible pulmonary infarct, subsegmental PE not determined. Pt with no right heart strain seen.   Case discussed with Pulmonary fellow. Pt can follow up as outpatient with pulmonary given stable vitals, improved symptoms, and no segmental PE.  Pt contacted to give update on CT results. Pt given instructions to follow up with pulmonary and strict return precautions given. Pt expresses understanding. Pt's information given to referrals coordinator.

## 2021-11-27 NOTE — ED PROVIDER NOTE - CLINICAL SUMMARY MEDICAL DECISION MAKING FREE TEXT BOX
25 y/o M with PMHx HIV presents to ED with c/o SOB and chest pain. Pt given fluids and Toradol. Plan to check labs, CXR, and reassess. 27 y/o M with PMHx HIV presents to ED with c/o SOB and chest pain. Pt given fluids and Toradol. Plan to check labs, CXR, and reassess.  On re-evaluation, pt is AAOx3 and in NAD. Pt has resolution of symptoms on re-evaluation. Vitals wnl. Instructions given to follow up in 1-2 days, return to ED for worsening condition. Pt expresses understanding.

## 2021-11-28 ENCOUNTER — EMERGENCY (EMERGENCY)
Facility: HOSPITAL | Age: 26
LOS: 1 days | Discharge: HOME | End: 2021-11-28
Attending: EMERGENCY MEDICINE | Admitting: EMERGENCY MEDICINE
Payer: MEDICAID

## 2021-11-28 VITALS
HEIGHT: 69 IN | TEMPERATURE: 98 F | HEART RATE: 110 BPM | SYSTOLIC BLOOD PRESSURE: 118 MMHG | OXYGEN SATURATION: 96 % | DIASTOLIC BLOOD PRESSURE: 78 MMHG | RESPIRATION RATE: 22 BRPM | WEIGHT: 134.92 LBS

## 2021-11-28 VITALS
RESPIRATION RATE: 16 BRPM | HEART RATE: 87 BPM | SYSTOLIC BLOOD PRESSURE: 119 MMHG | DIASTOLIC BLOOD PRESSURE: 76 MMHG | OXYGEN SATURATION: 99 % | TEMPERATURE: 99 F

## 2021-11-28 DIAGNOSIS — Z20.822 CONTACT WITH AND (SUSPECTED) EXPOSURE TO COVID-19: ICD-10-CM

## 2021-11-28 DIAGNOSIS — R07.89 OTHER CHEST PAIN: ICD-10-CM

## 2021-11-28 DIAGNOSIS — Z90.89 ACQUIRED ABSENCE OF OTHER ORGANS: Chronic | ICD-10-CM

## 2021-11-28 DIAGNOSIS — R06.02 SHORTNESS OF BREATH: ICD-10-CM

## 2021-11-28 DIAGNOSIS — I26.99 OTHER PULMONARY EMBOLISM WITHOUT ACUTE COR PULMONALE: ICD-10-CM

## 2021-11-28 LAB
ALBUMIN SERPL ELPH-MCNC: 4.1 G/DL — SIGNIFICANT CHANGE UP (ref 3.3–5)
ALP SERPL-CCNC: 99 U/L — SIGNIFICANT CHANGE UP (ref 40–120)
ALT FLD-CCNC: 57 U/L — HIGH (ref 10–45)
ANION GAP SERPL CALC-SCNC: 9 MMOL/L — SIGNIFICANT CHANGE UP (ref 5–17)
APTT BLD: 31.2 SEC — SIGNIFICANT CHANGE UP (ref 27.5–35.5)
AST SERPL-CCNC: 40 U/L — SIGNIFICANT CHANGE UP (ref 10–40)
BASOPHILS # BLD AUTO: 0 K/UL — SIGNIFICANT CHANGE UP (ref 0–0.2)
BASOPHILS NFR BLD AUTO: 0 % — SIGNIFICANT CHANGE UP (ref 0–2)
BILIRUB SERPL-MCNC: 0.2 MG/DL — SIGNIFICANT CHANGE UP (ref 0.2–1.2)
BUN SERPL-MCNC: 8 MG/DL — SIGNIFICANT CHANGE UP (ref 7–23)
CALCIUM SERPL-MCNC: 8.9 MG/DL — SIGNIFICANT CHANGE UP (ref 8.4–10.5)
CHLORIDE SERPL-SCNC: 101 MMOL/L — SIGNIFICANT CHANGE UP (ref 96–108)
CO2 SERPL-SCNC: 30 MMOL/L — SIGNIFICANT CHANGE UP (ref 22–31)
CREAT SERPL-MCNC: 0.76 MG/DL — SIGNIFICANT CHANGE UP (ref 0.5–1.3)
EOSINOPHIL # BLD AUTO: 0.09 K/UL — SIGNIFICANT CHANGE UP (ref 0–0.5)
EOSINOPHIL NFR BLD AUTO: 1.8 % — SIGNIFICANT CHANGE UP (ref 0–6)
GLUCOSE SERPL-MCNC: 73 MG/DL — SIGNIFICANT CHANGE UP (ref 70–99)
HCT VFR BLD CALC: 34.9 % — LOW (ref 39–50)
HGB BLD-MCNC: 10.9 G/DL — LOW (ref 13–17)
INR BLD: 1.08 — SIGNIFICANT CHANGE UP (ref 0.88–1.16)
LACTATE SERPL-SCNC: 1.4 MMOL/L — SIGNIFICANT CHANGE UP (ref 0.5–2)
LYMPHOCYTES # BLD AUTO: 0.93 K/UL — LOW (ref 1–3.3)
LYMPHOCYTES # BLD AUTO: 18.4 % — SIGNIFICANT CHANGE UP (ref 13–44)
MCHC RBC-ENTMCNC: 27.6 PG — SIGNIFICANT CHANGE UP (ref 27–34)
MCHC RBC-ENTMCNC: 31.2 GM/DL — LOW (ref 32–36)
MCV RBC AUTO: 88.4 FL — SIGNIFICANT CHANGE UP (ref 80–100)
MONOCYTES # BLD AUTO: 0.53 K/UL — SIGNIFICANT CHANGE UP (ref 0–0.9)
MONOCYTES NFR BLD AUTO: 10.5 % — SIGNIFICANT CHANGE UP (ref 2–14)
NEUTROPHILS # BLD AUTO: 3.12 K/UL — SIGNIFICANT CHANGE UP (ref 1.8–7.4)
NEUTROPHILS NFR BLD AUTO: 59.6 % — SIGNIFICANT CHANGE UP (ref 43–77)
NT-PROBNP SERPL-SCNC: 34 PG/ML — SIGNIFICANT CHANGE UP (ref 0–300)
PLATELET # BLD AUTO: 320 K/UL — SIGNIFICANT CHANGE UP (ref 150–400)
POTASSIUM SERPL-MCNC: 4.6 MMOL/L — SIGNIFICANT CHANGE UP (ref 3.5–5.3)
POTASSIUM SERPL-SCNC: 4.6 MMOL/L — SIGNIFICANT CHANGE UP (ref 3.5–5.3)
PROT SERPL-MCNC: 7.8 G/DL — SIGNIFICANT CHANGE UP (ref 6–8.3)
PROTHROM AB SERPL-ACNC: 12.9 SEC — SIGNIFICANT CHANGE UP (ref 10.6–13.6)
RBC # BLD: 3.95 M/UL — LOW (ref 4.2–5.8)
RBC # FLD: 12.8 % — SIGNIFICANT CHANGE UP (ref 10.3–14.5)
SARS-COV-2 RNA SPEC QL NAA+PROBE: NEGATIVE — SIGNIFICANT CHANGE UP
SODIUM SERPL-SCNC: 140 MMOL/L — SIGNIFICANT CHANGE UP (ref 135–145)
TROPONIN T SERPL-MCNC: 0.01 NG/ML — SIGNIFICANT CHANGE UP (ref 0–0.01)
WBC # BLD: 5.08 K/UL — SIGNIFICANT CHANGE UP (ref 3.8–10.5)
WBC # FLD AUTO: 5.08 K/UL — SIGNIFICANT CHANGE UP (ref 3.8–10.5)

## 2021-11-28 PROCEDURE — 87040 BLOOD CULTURE FOR BACTERIA: CPT

## 2021-11-28 PROCEDURE — 80053 COMPREHEN METABOLIC PANEL: CPT

## 2021-11-28 PROCEDURE — 87635 SARS-COV-2 COVID-19 AMP PRB: CPT

## 2021-11-28 PROCEDURE — 93005 ELECTROCARDIOGRAM TRACING: CPT

## 2021-11-28 PROCEDURE — 84484 ASSAY OF TROPONIN QUANT: CPT

## 2021-11-28 PROCEDURE — 71275 CT ANGIOGRAPHY CHEST: CPT | Mod: 26,MA

## 2021-11-28 PROCEDURE — 83880 ASSAY OF NATRIURETIC PEPTIDE: CPT

## 2021-11-28 PROCEDURE — 85025 COMPLETE CBC W/AUTO DIFF WBC: CPT

## 2021-11-28 PROCEDURE — 85610 PROTHROMBIN TIME: CPT

## 2021-11-28 PROCEDURE — 99285 EMERGENCY DEPT VISIT HI MDM: CPT

## 2021-11-28 PROCEDURE — 83605 ASSAY OF LACTIC ACID: CPT

## 2021-11-28 PROCEDURE — 85730 THROMBOPLASTIN TIME PARTIAL: CPT

## 2021-11-28 PROCEDURE — 36415 COLL VENOUS BLD VENIPUNCTURE: CPT

## 2021-11-28 PROCEDURE — 99053 MED SERV 10PM-8AM 24 HR FAC: CPT

## 2021-11-28 PROCEDURE — 96374 THER/PROPH/DIAG INJ IV PUSH: CPT | Mod: XU

## 2021-11-28 PROCEDURE — 71275 CT ANGIOGRAPHY CHEST: CPT | Mod: MA

## 2021-11-28 PROCEDURE — 93010 ELECTROCARDIOGRAM REPORT: CPT

## 2021-11-28 PROCEDURE — 99284 EMERGENCY DEPT VISIT MOD MDM: CPT | Mod: 25

## 2021-11-28 RX ORDER — SODIUM CHLORIDE 9 MG/ML
500 INJECTION INTRAMUSCULAR; INTRAVENOUS; SUBCUTANEOUS ONCE
Refills: 0 | Status: COMPLETED | OUTPATIENT
Start: 2021-11-28 | End: 2021-11-28

## 2021-11-28 RX ORDER — KETOROLAC TROMETHAMINE 30 MG/ML
15 SYRINGE (ML) INJECTION ONCE
Refills: 0 | Status: DISCONTINUED | OUTPATIENT
Start: 2021-11-28 | End: 2021-11-28

## 2021-11-28 RX ORDER — APIXABAN 2.5 MG/1
1 TABLET, FILM COATED ORAL
Qty: 60 | Refills: 0
Start: 2021-11-28 | End: 2021-12-27

## 2021-11-28 RX ORDER — APIXABAN 2.5 MG/1
10 TABLET, FILM COATED ORAL ONCE
Refills: 0 | Status: COMPLETED | OUTPATIENT
Start: 2021-11-28 | End: 2021-11-28

## 2021-11-28 RX ADMIN — SODIUM CHLORIDE 500 MILLILITER(S): 9 INJECTION INTRAMUSCULAR; INTRAVENOUS; SUBCUTANEOUS at 04:43

## 2021-11-28 RX ADMIN — Medication 15 MILLIGRAM(S): at 04:28

## 2021-11-28 RX ADMIN — APIXABAN 10 MILLIGRAM(S): 2.5 TABLET, FILM COATED ORAL at 06:40

## 2021-11-28 RX ADMIN — Medication 15 MILLIGRAM(S): at 05:30

## 2021-11-28 NOTE — ED PROVIDER NOTE - CARE PLAN
Principal Discharge DX:	Acute chest pain   1 Principal Discharge DX:	Pulmonary emboli  Secondary Diagnosis:	Chest pain

## 2021-11-28 NOTE — ED PROVIDER NOTE - PHYSICAL EXAMINATION
GEN: Well appearing, well developed, awake, alert, oriented to person, place, time/situation and appears uncomfortable 2/2 pain. NTAF  ENT: Airway patent, Nasal mucosa clear. Mouth with normal mucosa.  EYES: Clear bilaterally. PERRL, EOMI  RESPIRATORY: Breathing comfortably with normal RR. No W/C/R, no hypoxia or resp distress.  CARDIAC: Borderline tachy, Regular rhythm, no M/R/G  ABDOMEN: Soft, nontender, +bowel sounds, no rebound, rigidity, or guarding.  MSK: Range of motion is not limited, no deformities noted.  NEURO: Alert and oriented, no focal deficits.  SKIN: Skin normal color for race, warm, dry and intact. No evidence of rash.  PSYCH: Alert and oriented to person, place, time/situation. anxious mood and affect. no apparent risk to self or others.

## 2021-11-28 NOTE — ED PROVIDER NOTE - NSFOLLOWUPINSTRUCTIONS_ED_ALL_ED_FT
Please follow up with your primary care physician and a pulmonologist. Please take blood thinner to treat the blood clots in your lung as directed (Eliquis). Take 2  tabs orally 2 times a day for 7 days, then 1 tab orally 2 times a day. You will need to get refills from your PMD or pulmonologist and they will tell you how long you need to be on the blood thinner for. You may call our referrals coordinator at [phone] Monday to Friday 11am-7pm for assistance with making an appointment.  Take Tylenol as needed for pain.   Return to the Emergency Department if you have any new or worsening symptoms, or for any other concerns. Please read below for further information.      Pulmonary Embolism    WHAT YOU NEED TO KNOW:    A pulmonary embolism (PE) is the sudden blockage of a blood vessel in the lungs by an embolus. A PE can become life-threatening. Go to follow-up appointments and take blood thinners as directed. These are especially important if you were discharged home from the emergency department.    Pulmonary Embolism         DISCHARGE INSTRUCTIONS:    Call your local emergency number (911 in the [place]) if:   •You feel lightheaded, short of breath, and have chest pain.      •You cough up blood.      Call your doctor if:   •Your arm or leg feels warm, tender, and painful. It may look swollen and red.      •You have questions or concerns about your condition or care.      Medicines:   •Blood thinners help prevent blood clots. Clots can cause strokes, heart attacks, and death. The following are general safety guidelines to follow while you are taking a blood thinner:?Watch for bleeding and bruising while you take blood thinners. Watch for bleeding from your gums or nose. Watch for blood in your urine and bowel movements. Use a soft washcloth on your skin, and a soft toothbrush to brush your teeth. This can keep your skin and gums from bleeding. If you shave, use an electric shaver. Do not play contact sports.       ?Tell your dentist and other healthcare providers that you take a blood thinner. Wear a bracelet or necklace that says you take this medicine.       ?Do not start or stop any other medicines unless your healthcare provider tells you to. Many medicines cannot be used with blood thinners.      ?Take your blood thinner exactly as prescribed by your healthcare provider. Do not skip does or take less than prescribed. Tell your provider right away if you forget to take your blood thinner, or if you take too much.      ?Warfarin is a blood thinner that you may need to take. The following are things you should be aware of if you take warfarin: ?Foods and medicines can affect the amount of warfarin in your blood. Do not make major changes to your diet while you take warfarin. Warfarin works best when you eat about the same amount of vitamin K every day. Vitamin K is found in green leafy vegetables and certain other foods. Ask for more information about what to eat when you are taking warfarin.      ?You will need to see your healthcare provider for follow-up visits when you are on warfarin. You will need regular blood tests. These tests are used to decide how much medicine you need.         •Take your medicine as directed. Contact your healthcare provider if you think your medicine is not helping or if you have side effects. Tell him or her if you are allergic to any medicine. Keep a list of the medicines, vitamins, and herbs you take. Include the amounts, and when and why you take them. Bring the list or the pill bottles to follow-up visits. Carry your medicine list with you in case of an emergency.      Prevent another PE:   •Change your body position or move around often. Move and stretch in your seat several times each hour if you travel by car or work at a desk. In an airplane, get up and walk every hour.      •Exercise regularly to help increase your blood flow. Walking is a good low-impact exercise. Talk to your healthcare provider about the best exercise plan for you.   Walking for Exercise           •Maintain a healthy weight. Ask your healthcare provider how much you should weigh. Ask him or her to help you create a weight loss plan if you are overweight.      •Do not smoke. Nicotine and other chemicals in cigarettes and cigars can damage blood vessels and increase your risk for another PE. Ask your healthcare provider for information if you currently smoke and need help to quit. E-cigarettes or smokeless tobacco still contain nicotine. Talk to your healthcare provider before you use these products.      •Ask about birth control if you are a woman who takes the pill. A birth control pill increases the risk for blood clots in certain women. The risk is higher if you are also older than 35, smoke cigarettes, or have a blood clotting disorder. Talk to your healthcare provider about other ways to prevent pregnancy, such as a cervical cap or intrauterine device (IUD).      Follow up with your doctor or specialist as directed: Make an appointment as soon as possible. You may also need to come in regularly for scans to check for blood clots. Your blood may checked to see how long it takes to clot. Your doctor or specialist will tell you if you need to have this test and how often to have it. Write down your questions so you remember to ask them during your visits.

## 2021-11-28 NOTE — ED PROVIDER NOTE - PRIOR EKG STATUS
PHYSICIAN NEXT STEPS:  Call the Patient    CHIEF COMPLAINT:  Chief Complaint/Protocol Used: Coronavirus (COVID-19) - Diagnosed or Suspected (A)  Onset: 10/18/2020      ASSESSMENT:  ? Onset: 10/18/2020  ? Normal True  ? Normal, no trouble breathing True  ? Covid-19 Diagnosis: None  ? Onset: Sunday.  ? Worst Symptom: Nausea, vomiting, diarrhea and fever (some resolved).  ? Cough: No  ? Fever: No  ? Respiratory Status: None.  ? Better-same-worse: Better since Sunday.  ? High Risk Disease: None.  ? Other Symptoms: None.  -------------------------------------------------------    DISPOSITION:  Disposition Recommendation: Home Care  Questions that led to disposition:  ? 1] COVID-19 infection diagnosed or suspected AND [2] mild symptoms (fever, cough) AND [2] no trouble breathing or other complications  Patient Directed To: Unspecified  Patient Intended Action: Send a message to my doctor      CALL NOTES:  10/23/2020 at 10:48 AM by Yessica Davis  ? Needs note to return to work.  Stated may go to [facility] to get tested or [facility].  Denies any respiratory issues or fever;  symptoms are gone per patient CDC guidelines given for stopping isolation.    DISPOSITION OVERRIDE/PROVIDER CONSULT:  Disposition Override: N/A  Override Source: Unspecified  Consulted with PCP: No  Consulted with On-Call Physician: No    CALLER CONTACT INFO:  Name: Rosibel Jeronimo (Self)  Phone 1: [phone] (Home Phone)  Phone 2: [phone] (Mobile) - Preferred  Phone 3: [phone] (Work Phone)      ENCOUNTER STARTED:  10/23/20 10:37:00 AM  ENCOUNTER ASSIGNED TO/CLOSED BY:  Yessica Davis @ 10/23/20 10:49:09 AM      -------------------------------------------------------    CARE ADVICE given per Coronavirus (COVID-19) - Diagnosed or Suspected (A) guideline.  STOPPING HOME ISOLATION - MUST MEET ALL 3 REQUIREMENTS (CDC):  * Fever gone for at least 72 hours (3 full days) off fever-reducing medicines AND  * Cough and other symptoms must be  improved AND  * Symptoms started more than 7 days ago.  * If unsure if it is safe for you to leave isolation, check the CDC website or call your healthcare provider.; CALL BACK IF:   * Fever over 103 F (39.4 C)  * Fever lasts over 3 days  * Fever returns after being gone for 24 hours  * Chest pain or difficulty breathing occurs  * You become worse.      UNDERSTANDS CARE ADVICE: Yes    AGREES WITH CARE ADVICE: Yes    WILL FOLLOW CARE ADVICE: Yes    -------------------------------------------------------   there is no prior EKG available for comparison

## 2021-11-28 NOTE — ED PROVIDER NOTE - CARE PROVIDERS DIRECT ADDRESSES
[email or web address]

## 2021-11-28 NOTE — ED ADULT NURSE NOTE - OBJECTIVE STATEMENT
Pt reports worsening CP/SOB. Pt reports he was seen in [facility] ED earlier tonight and d/c'd home for f/u outpatient. Pt reports SOB worse and reports generalized CP, worse on R side with radiation to R lower back. Pt denies fever/chills, no cough/congestion, no dizziness/lightheadedness. Pt reports pain 10/10 at this time, described as intermittent sharp pain, worse with movement and positioning. Pt reports worsening CP/SOB. Pt reports he was seen in [facility] ED earlier tonight and d/c'd home for f/u outpatient. Pt reports SOB worse and reports generalized CP, worse on R side with radiation to R lower back. Pt denies fever/chills, no cough/congestion, no dizziness/lightheadedness. Pt reports pain 10/10 at this time, described as intermittent sharp pain, worse with movement and positioning. Pt O2 sat 96% on RA, speaking in full sentences, airway patent. Pt breathing shallow and tachypneic in triage, improving in bed.

## 2021-11-28 NOTE — ED ADULT NURSE NOTE - SCORE
DISCHARGE PLANNING    • Discharge to home or other facility with appropriate resources Progressing        HEMATOLOGIC - ADULT    • Maintains hematologic stability Progressing    • Free from bleeding injury Progressing        Impaired Activities of Daily Marsha Sour 2

## 2021-11-28 NOTE — ED PROVIDER NOTE - PROVIDER TOKENS
PROVIDER:[TOKEN:[[id number]]],PROVIDER:[TOKEN:[[id number]]],PROVIDER:[TOKEN:[[id number]]]

## 2021-11-28 NOTE — ED PROVIDER NOTE - CARE PROVIDER_API CALL
Julissa Domínguez)  Critical Care Medicine; Pulmonary Disease  [address], [address] [place] [address]  Phone: [phone]  Fax: [phone]  Follow Up Time:     Herman Ayala)  Critical Care Medicine; Internal Medicine; Pulmonary Disease  [address], [address] [place] [address]  Phone: [phone]  Fax: [phone]  Follow Up Time:     Abby Harrison  Pulmonary Medicine  [address] [address]  Phone: [phone]  Fax: [phone]  Follow Up Time:

## 2021-11-28 NOTE — ED ADULT TRIAGE NOTE - OTHER COMPLAINTS
CC of sob x today, dc'ed today but 2 hrs upon reaching home the pain started again particularly on the R side of the chest. Hx of HIV on Biktarvy, unable to lie down on bed straight and hard during inspiration.

## 2021-11-28 NOTE — ED PROVIDER NOTE - OBJECTIVE STATEMENT
26M with a h/o HIV, recently started on meds who p/w right sided chest pain that started yesterday, associated with SOB and generalized weakness. Pt was seen in the ER and had a CTA that was prelim negative for PE however called back for radiology over read that shows possible pulmonary infarct and cannot r/o PE due to suboptimal study, recommend repeat CTA. Pt was given Toradol and IVFs with improvement of pain in the ER however pain and SOB has returned and now worsening. No fever, no cough, no dizziness or syncope. No other complaints at this time.

## 2021-11-28 NOTE — ED PROVIDER NOTE - CLINICAL SUMMARY MEDICAL DECISION MAKING FREE TEXT BOX
26M with a h/o HIV, recently started on meds who p/w right sided chest pain that started yesterday, associated with SOB and generalized weakness. Pt was seen in the ER and had a CTA that was prelim negative for PE however called back for radiology over read that shows possible pulmonary infarct and cannot r/o PE due to suboptimal study, recommend repeat CTA. Pt was given Toradol and IVFs with improvement of pain in the ER however pain and SOB has returned and now worsening. No fever, no cough, no dizziness or syncope. No other complaints at this time.   VS notable for borderline tachycardia, no hypoxia, VS otherwise stable, pt appears very uncomfortable 2/2 pain, Will given IV toradol for pain (which controlled pain when patient was here yesterday), labs and repeat CTA to eval for PE, r/o pna.  Dispo pending workup 26M with a h/o HIV, recently started on meds who p/w right sided chest pain that started yesterday, associated with SOB and generalized weakness. Pt was seen in the ER and had a CTA that was prelim negative for PE however called back for radiology over read that shows possible pulmonary infarct and cannot r/o PE due to suboptimal study, recommend repeat CTA. Pt was given Toradol and IVFs with improvement of pain in the ER however pain and SOB has returned and now worsening. No fever, no cough, no dizziness or syncope. No other complaints at this time.   VS notable for borderline tachycardia, no hypoxia, VS otherwise stable, pt appears very uncomfortable 2/2 pain, Will given IV toradol for pain (which controlled pain when patient was here yesterday), labs and repeat CTA to eval for PE, r/o pna.  Dispo pending workup    CTA show b/l subsegmental emboli, also ?pna on vrad read, however pt w/o fever or cough, more likely lung findings from PE. trop and bnp neg, no evidence of right heart strain. Will start on Eliquis, Rx sent to pharmacy of choice. Pt advised he will need prompt pulm follow up and hypercoagulability workup.

## 2021-11-28 NOTE — ED PROVIDER NOTE - PATIENT PORTAL LINK FT
You can access the FollowMyHealth Patient Portal offered by [facility] by registering at the following website: [email or web address]. By joining [employer]’s FollowMyHealth portal, you will also be able to view your health information using other applications (apps) compatible with our system.

## 2021-11-28 NOTE — ED POST DISCHARGE NOTE - RESULT SUMMARY
Radiology called w/ new CT read. Pt was dc'd on eliquis but based on new read eliquis should be stopped. Additionally, pt would likely benefit from tx for PNA given worsening Ct findings. I attempted to call pt at his home number and left voicemail. I would have asked about possible pna symptoms and would have likely prescribed augmentin/zpack and dc'd eliquis. No call back yet.

## 2021-11-28 NOTE — ED POST DISCHARGE NOTE - DETAILS
CT results reviewed- there is + acute PE, will have pt continue eliquis.  only c/o chest tightness- NO chough/fevers.  will not start abx.  has outpt pmd f/u.

## 2021-11-29 ENCOUNTER — NON-APPOINTMENT (OUTPATIENT)
Age: 26
End: 2021-11-29

## 2021-11-29 LAB
CULTURE RESULTS: NO GROWTH — SIGNIFICANT CHANGE UP
SPECIMEN SOURCE: SIGNIFICANT CHANGE UP

## 2021-11-30 ENCOUNTER — APPOINTMENT (OUTPATIENT)
Dept: PULMONOLOGY | Facility: CLINIC | Age: 26
End: 2021-11-30
Payer: COMMERCIAL

## 2021-11-30 VITALS
HEIGHT: 69 IN | DIASTOLIC BLOOD PRESSURE: 82 MMHG | SYSTOLIC BLOOD PRESSURE: 144 MMHG | HEART RATE: 110 BPM | TEMPERATURE: 98.7 F | BODY MASS INDEX: 21.36 KG/M2 | OXYGEN SATURATION: 98 % | WEIGHT: 144.25 LBS

## 2021-11-30 DIAGNOSIS — J18.9 PNEUMONIA, UNSPECIFIED ORGANISM: ICD-10-CM

## 2021-11-30 PROCEDURE — 99072 ADDL SUPL MATRL&STAF TM PHE: CPT

## 2021-11-30 PROCEDURE — 99204 OFFICE O/P NEW MOD 45 MIN: CPT

## 2021-11-30 RX ORDER — AMOXICILLIN AND CLAVULANATE POTASSIUM 875; 125 MG/1; MG/1
875-125 TABLET, COATED ORAL TWICE DAILY
Qty: 14 | Refills: 0 | Status: ACTIVE | COMMUNITY
Start: 2021-11-30 | End: 1900-01-01

## 2021-11-30 NOTE — REASON FOR VISIT
[Consultation] : a consultation [Abnormal CXR/ Chest CT] : an abnormal CXR/ chest CT [Chest Pain] : chest pain [Shortness of Breath] : shortness of breath

## 2021-11-30 NOTE — REVIEW OF SYSTEMS
[Fever] : no fever [Chills] : no chills [Dry Eyes] : no dry eyes [Eye Irritation] : no eye irritation [Cough] : no cough [Sputum] : no sputum [Pleuritic Pain] : pleuritic pain [Chest Discomfort] : no chest discomfort [Orthopnea] : no orthopnea [Syncope] : no syncope [GERD] : no gerd [Diarrhea] : no diarrhea [Nocturia] : no nocturia [Arthralgias] : no arthralgias [Depression] : no depression [Diabetes] : no diabetes

## 2021-11-30 NOTE — DISCUSSION/SUMMARY
[FreeTextEntry1] : 26 year old male with h/o HIV (on biktarvy) with recent history of shigella was referred to pulmonary clinic by ER for lung consolidation on CT chest.\par \par Review:\par - ER notes\par - Labs\par - CTA chest (11/28/21): \par 1. No evidence of pulmonary artery embolism.\par 2. Comparison to 11/27/2021 and 11/10/2021 New tree-in-bud and clustered nonsolid and solid subcentimeter nodules noted within the superior segment and posterior basal segment of the left lower lobe. Continued evidence of bibasilar dependent atelectasis as well as stable asymmetric subpleural linear and groundglass opacities within the right lower lobe. New small airways inflammation and an early bibasilar multifocal pneumonia cannot be excluded. Aspiration in the proper clinical setting cannot be ruled out.\par 3. Bilateral mild mosaic attenuation pattern which may suggest underlying air trapping\par \par A/P\par CT reading by onsite radiologist ruled out PE. Plan to d/c eliquis. Differential pneumonia vs organizing pneumonia vs aspiration pnuemonia. Plan to treat it as pneumonia with Augmentin for 7 days. Follow up radiology in 8-12 weeks. Patient will call me if his symptoms are getting worse again. He knows to come back to ED if his symptoms deteriorates suddenly.

## 2021-11-30 NOTE — CONSULT LETTER
[Dear  ___] : Dear  [unfilled], [Consult Letter:] : I had the pleasure of evaluating your patient, [unfilled]. [Please see my note below.] : Please see my note below. [Consult Closing:] : Thank you very much for allowing me to participate in the care of this patient.  If you have any questions, please do not hesitate to contact me. [FreeTextEntry3] : Sincerely\par \par Vance Hancock MD [facility]\par , [facility] [employer]\par Associate , Pulmonary and Critical Care Fellowship\par Pulmonary and Critical Care\par [facility]\par Phone: [phone]\par

## 2021-11-30 NOTE — PHYSICAL EXAM
[No Acute Distress] : no acute distress [Well Nourished] : well nourished [Normal Oropharynx] : normal oropharynx [Normal Appearance] : normal appearance [No JVD] : no jvd [Normal Rate/Rhythm] : normal rate/rhythm [Normal S1, S2] : normal s1, s2 [No Resp Distress] : no resp distress [Clear to Auscultation Bilaterally] : clear to auscultation bilaterally [No Abnormalities] : no abnormalities [Benign] : benign [Normal Gait] : normal gait [No Clubbing] : no clubbing [No Edema] : no edema [Normal Color/ Pigmentation] : normal color/ pigmentation [No Rash] : no rash [No Focal Deficits] : no focal deficits [No Sensory Deficits] : no sensory deficits [Oriented x3] : oriented x3 [Normal Affect] : normal affect

## 2021-11-30 NOTE — HISTORY OF PRESENT ILLNESS
[TextBox_4] : 26 year old male with h/o HIV (on biktarvy) with recent history of shigella was referred to pulmonary clinic by ER for lung consolidation on CT chest. Patient went to ED with persistent pleuritic chest pain on right side along with difficulty in breathing. Initial CT chest read was suggestive of bilateral lower lobe subsegmental PE. Although, final read by onsite radiologist ruled out PE and read it as pleura bases inflammation or pneumonia.\par At present, patient is taking eliquis daily and his chest pain is persisting but improved significantly. Patient has nasal congestion and post nasal drip which is chronic.

## 2021-12-01 ENCOUNTER — APPOINTMENT (OUTPATIENT)
Dept: INFECTIOUS DISEASE | Facility: CLINIC | Age: 26
End: 2021-12-01

## 2021-12-02 ENCOUNTER — NON-APPOINTMENT (OUTPATIENT)
Age: 26
End: 2021-12-02

## 2021-12-02 LAB
CULTURE RESULTS: SIGNIFICANT CHANGE UP
CULTURE RESULTS: SIGNIFICANT CHANGE UP
SPECIMEN SOURCE: SIGNIFICANT CHANGE UP
SPECIMEN SOURCE: SIGNIFICANT CHANGE UP

## 2022-12-02 ENCOUNTER — OUT OF OFFICE VISIT (OUTPATIENT)
Dept: URBAN - METROPOLITAN AREA MEDICAL CENTER 8 | Facility: MEDICAL CENTER | Age: 27
End: 2022-12-02
Payer: COMMERCIAL

## 2022-12-02 DIAGNOSIS — K85.80 ACUTE VIRAL PANCREATITIS: ICD-10-CM

## 2022-12-02 DIAGNOSIS — R74.8 ABNORMAL ALKALINE PHOSPHATASE TEST: ICD-10-CM

## 2022-12-02 DIAGNOSIS — R93.3 ABN FINDINGS-GI TRACT: ICD-10-CM

## 2022-12-02 PROCEDURE — 99254 IP/OBS CNSLTJ NEW/EST MOD 60: CPT | Performed by: INTERNAL MEDICINE

## 2022-12-03 ENCOUNTER — OUT OF OFFICE VISIT (OUTPATIENT)
Dept: URBAN - METROPOLITAN AREA MEDICAL CENTER 8 | Facility: MEDICAL CENTER | Age: 27
End: 2022-12-03
Payer: COMMERCIAL

## 2022-12-03 DIAGNOSIS — R74.8 ABNORMAL ALKALINE PHOSPHATASE TEST: ICD-10-CM

## 2022-12-03 DIAGNOSIS — K85.80 ACUTE VIRAL PANCREATITIS: ICD-10-CM

## 2022-12-03 PROCEDURE — 99232 SBSQ HOSP IP/OBS MODERATE 35: CPT | Performed by: INTERNAL MEDICINE

## 2024-10-21 NOTE — ED ADULT NURSE NOTE - NSIMPLEMENTINTERV_GEN_ALL_ED
Please see results. Continues to report syncope   Implemented All Universal Safety Interventions:  [place] to call system. Call bell, personal items and telephone within reach. Instruct patient to call for assistance. Room bathroom lighting operational. Non-slip footwear when patient is off stretcher. Physically safe environment: no spills, clutter or unnecessary equipment. Stretcher in lowest position, wheels locked, appropriate side rails in place.